# Patient Record
Sex: MALE | Race: OTHER | NOT HISPANIC OR LATINO
[De-identification: names, ages, dates, MRNs, and addresses within clinical notes are randomized per-mention and may not be internally consistent; named-entity substitution may affect disease eponyms.]

---

## 2017-01-10 ENCOUNTER — APPOINTMENT (OUTPATIENT)
Dept: ENDOCRINOLOGY | Facility: CLINIC | Age: 57
End: 2017-01-10

## 2017-01-26 ENCOUNTER — LABORATORY RESULT (OUTPATIENT)
Age: 57
End: 2017-01-26

## 2017-02-08 ENCOUNTER — APPOINTMENT (OUTPATIENT)
Dept: ENDOCRINOLOGY | Facility: CLINIC | Age: 57
End: 2017-02-08

## 2017-02-08 VITALS
WEIGHT: 165 LBS | HEIGHT: 66 IN | BODY MASS INDEX: 26.52 KG/M2 | SYSTOLIC BLOOD PRESSURE: 142 MMHG | DIASTOLIC BLOOD PRESSURE: 92 MMHG | HEART RATE: 74 BPM

## 2017-02-08 LAB — GLUCOSE BLDC GLUCOMTR-MCNC: 107

## 2017-03-24 ENCOUNTER — LABORATORY RESULT (OUTPATIENT)
Age: 57
End: 2017-03-24

## 2017-03-27 ENCOUNTER — APPOINTMENT (OUTPATIENT)
Dept: ENDOCRINOLOGY | Facility: CLINIC | Age: 57
End: 2017-03-27

## 2017-03-27 VITALS
WEIGHT: 162 LBS | DIASTOLIC BLOOD PRESSURE: 76 MMHG | SYSTOLIC BLOOD PRESSURE: 122 MMHG | HEIGHT: 66 IN | BODY MASS INDEX: 26.03 KG/M2 | HEART RATE: 76 BPM

## 2017-03-27 LAB
ALBUMIN SERPL ELPH-MCNC: 4.1 G/DL
ALP BLD-CCNC: 76 U/L
ALT SERPL-CCNC: 15 U/L
AMYLASE/CREAT SERPL: 141 U/L
ANION GAP SERPL CALC-SCNC: 15 MMOL/L
AST SERPL-CCNC: 10 U/L
BILIRUB SERPL-MCNC: 0.3 MG/DL
BUN SERPL-MCNC: 18 MG/DL
CALCIUM SERPL-MCNC: 9.9 MG/DL
CHLORIDE SERPL-SCNC: 103 MMOL/L
CO2 SERPL-SCNC: 21 MMOL/L
CREAT SERPL-MCNC: 1.16 MG/DL
GGT SERPL-CCNC: 42 U/L
GLUCOSE BLDC GLUCOMTR-MCNC: 154
GLUCOSE SERPL-MCNC: 215 MG/DL
LPL SERPL-CCNC: 91 U/L
POTASSIUM SERPL-SCNC: 4.2 MMOL/L
PROT SERPL-MCNC: 7.6 G/DL
SODIUM SERPL-SCNC: 139 MMOL/L

## 2017-04-14 ENCOUNTER — LABORATORY RESULT (OUTPATIENT)
Age: 57
End: 2017-04-14

## 2017-05-07 ENCOUNTER — MOBILE ON CALL (OUTPATIENT)
Age: 57
End: 2017-05-07

## 2017-06-08 ENCOUNTER — APPOINTMENT (OUTPATIENT)
Dept: ENDOCRINOLOGY | Facility: CLINIC | Age: 57
End: 2017-06-08

## 2017-06-08 ENCOUNTER — LABORATORY RESULT (OUTPATIENT)
Age: 57
End: 2017-06-08

## 2017-06-08 VITALS
WEIGHT: 165 LBS | DIASTOLIC BLOOD PRESSURE: 80 MMHG | HEART RATE: 80 BPM | BODY MASS INDEX: 26.52 KG/M2 | SYSTOLIC BLOOD PRESSURE: 120 MMHG | HEIGHT: 66 IN

## 2017-06-08 LAB
GLUCOSE BLDC GLUCOMTR-MCNC: 319
HBA1C MFR BLD HPLC: 10.3

## 2017-06-12 LAB
ALBUMIN SERPL ELPH-MCNC: 4.7 G/DL
ALP BLD-CCNC: 80 U/L
ALT SERPL-CCNC: 25 U/L
AMYLASE/CREAT SERPL: 144 U/L
ANION GAP SERPL CALC-SCNC: 20 MMOL/L
AST SERPL-CCNC: 20 U/L
BILIRUB SERPL-MCNC: 0.2 MG/DL
BUN SERPL-MCNC: 23 MG/DL
CALCIUM SERPL-MCNC: 9.7 MG/DL
CHLORIDE SERPL-SCNC: 98 MMOL/L
CO2 SERPL-SCNC: 18 MMOL/L
CREAT SERPL-MCNC: 1.29 MG/DL
GLUCOSE SERPL-MCNC: 336 MG/DL
LPL SERPL-CCNC: 104 U/L
POTASSIUM SERPL-SCNC: 5.1 MMOL/L
PROT SERPL-MCNC: 7.6 G/DL
SODIUM SERPL-SCNC: 136 MMOL/L

## 2017-06-21 ENCOUNTER — APPOINTMENT (OUTPATIENT)
Dept: ENDOCRINOLOGY | Facility: CLINIC | Age: 57
End: 2017-06-21

## 2017-07-05 ENCOUNTER — RX RENEWAL (OUTPATIENT)
Age: 57
End: 2017-07-05

## 2017-07-24 ENCOUNTER — APPOINTMENT (OUTPATIENT)
Dept: ENDOCRINOLOGY | Facility: CLINIC | Age: 57
End: 2017-07-24

## 2017-09-01 ENCOUNTER — MOBILE ON CALL (OUTPATIENT)
Age: 57
End: 2017-09-01

## 2017-09-07 ENCOUNTER — LABORATORY RESULT (OUTPATIENT)
Age: 57
End: 2017-09-07

## 2017-09-07 ENCOUNTER — APPOINTMENT (OUTPATIENT)
Dept: ENDOCRINOLOGY | Facility: CLINIC | Age: 57
End: 2017-09-07
Payer: COMMERCIAL

## 2017-09-07 VITALS
SYSTOLIC BLOOD PRESSURE: 142 MMHG | HEART RATE: 72 BPM | WEIGHT: 167 LBS | HEIGHT: 66 IN | DIASTOLIC BLOOD PRESSURE: 84 MMHG | BODY MASS INDEX: 26.84 KG/M2

## 2017-09-07 LAB
GLUCOSE BLDC GLUCOMTR-MCNC: 143
HBA1C MFR BLD HPLC: 7.3

## 2017-09-07 PROCEDURE — 82962 GLUCOSE BLOOD TEST: CPT

## 2017-09-07 PROCEDURE — 99214 OFFICE O/P EST MOD 30 MIN: CPT | Mod: 25

## 2017-09-07 PROCEDURE — 95251 CONT GLUC MNTR ANALYSIS I&R: CPT

## 2017-09-07 PROCEDURE — 83036 HEMOGLOBIN GLYCOSYLATED A1C: CPT | Mod: QW

## 2017-09-07 RX ORDER — PEN NEEDLE, DIABETIC 29 G X1/2"
31G X 8 MM NEEDLE, DISPOSABLE MISCELLANEOUS
Qty: 100 | Refills: 0 | Status: ACTIVE | COMMUNITY
Start: 2017-03-15

## 2017-09-25 ENCOUNTER — RX RENEWAL (OUTPATIENT)
Age: 57
End: 2017-09-25

## 2017-11-07 ENCOUNTER — APPOINTMENT (OUTPATIENT)
Dept: ENDOCRINOLOGY | Facility: CLINIC | Age: 57
End: 2017-11-07

## 2017-11-15 ENCOUNTER — RX RENEWAL (OUTPATIENT)
Age: 57
End: 2017-11-15

## 2017-11-29 ENCOUNTER — LABORATORY RESULT (OUTPATIENT)
Age: 57
End: 2017-11-29

## 2017-11-29 LAB
AMYLASE/CREAT SERPL: 311 U/L
APPEARANCE: CLEAR
BILIRUBIN URINE: NEGATIVE
BLOOD URINE: ABNORMAL
CHOLEST SERPL-MCNC: 141 MG/DL
CHOLEST/HDLC SERPL: 3 RATIO
COLOR: YELLOW
CREAT SPEC-SCNC: 82 MG/DL
GLUCOSE QUALITATIVE U: 1000 MG/DL
HBA1C MFR BLD HPLC: 7.5 %
HDLC SERPL-MCNC: 47 MG/DL
KETONES URINE: NEGATIVE
LDLC SERPL CALC-MCNC: 60 MG/DL
LEUKOCYTE ESTERASE URINE: NEGATIVE
LPL SERPL-CCNC: 486 U/L
MICROALBUMIN 24H UR DL<=1MG/L-MCNC: 1.7 MG/DL
MICROALBUMIN/CREAT 24H UR-RTO: 21 MG/G
NITRITE URINE: NEGATIVE
PH URINE: 5.5
PROTEIN URINE: NEGATIVE MG/DL
SPECIFIC GRAVITY URINE: 1.03
T4 FREE SERPL-MCNC: 1.1 NG/DL
T4 SERPL-MCNC: 5.5 UG/DL
TRIGL SERPL-MCNC: 168 MG/DL
TSH SERPL-ACNC: 1.78 UIU/ML
UROBILINOGEN URINE: NEGATIVE MG/DL

## 2017-12-04 LAB
ALBUMIN SERPL ELPH-MCNC: 4.6 G/DL
ALP BLD-CCNC: 60 U/L
ALT SERPL-CCNC: 18 U/L
AST SERPL-CCNC: 12 U/L
BILIRUB SERPL-MCNC: 0.4 MG/DL
CALCIUM SERPL-MCNC: 10.4 MG/DL
CHLORIDE SERPL-SCNC: 105 MMOL/L
CO2 SERPL-SCNC: 24 MMOL/L
CREAT SERPL-MCNC: 1.27 MG/DL
GLUCOSE SERPL-MCNC: 159 MG/DL
PROT SERPL-MCNC: 8 G/DL
SODIUM SERPL-SCNC: 145 MMOL/L

## 2017-12-06 ENCOUNTER — RX RENEWAL (OUTPATIENT)
Age: 57
End: 2017-12-06

## 2018-02-05 ENCOUNTER — APPOINTMENT (OUTPATIENT)
Dept: ENDOCRINOLOGY | Facility: CLINIC | Age: 58
End: 2018-02-05
Payer: COMMERCIAL

## 2018-02-05 ENCOUNTER — RX RENEWAL (OUTPATIENT)
Age: 58
End: 2018-02-05

## 2018-02-05 VITALS
BODY MASS INDEX: 26.38 KG/M2 | SYSTOLIC BLOOD PRESSURE: 152 MMHG | HEART RATE: 84 BPM | HEIGHT: 66 IN | WEIGHT: 164.13 LBS | DIASTOLIC BLOOD PRESSURE: 100 MMHG

## 2018-02-05 LAB
GLUCOSE BLDC GLUCOMTR-MCNC: 131
HBA1C MFR BLD HPLC: 7.7

## 2018-02-05 PROCEDURE — 82962 GLUCOSE BLOOD TEST: CPT

## 2018-02-05 PROCEDURE — 99214 OFFICE O/P EST MOD 30 MIN: CPT | Mod: 25

## 2018-02-05 PROCEDURE — 95251 CONT GLUC MNTR ANALYSIS I&R: CPT

## 2018-02-05 PROCEDURE — 83036 HEMOGLOBIN GLYCOSYLATED A1C: CPT | Mod: QW

## 2018-02-07 ENCOUNTER — RX RENEWAL (OUTPATIENT)
Age: 58
End: 2018-02-07

## 2018-02-12 ENCOUNTER — LABORATORY RESULT (OUTPATIENT)
Age: 58
End: 2018-02-12

## 2018-02-12 ENCOUNTER — APPOINTMENT (OUTPATIENT)
Dept: ENDOCRINOLOGY | Facility: CLINIC | Age: 58
End: 2018-02-12
Payer: COMMERCIAL

## 2018-02-12 PROCEDURE — G0108 DIAB MANAGE TRN  PER INDIV: CPT

## 2018-03-14 ENCOUNTER — APPOINTMENT (OUTPATIENT)
Dept: ENDOCRINOLOGY | Facility: CLINIC | Age: 58
End: 2018-03-14
Payer: COMMERCIAL

## 2018-03-14 VITALS — HEART RATE: 72 BPM | DIASTOLIC BLOOD PRESSURE: 90 MMHG | SYSTOLIC BLOOD PRESSURE: 180 MMHG

## 2018-03-14 VITALS
WEIGHT: 165 LBS | DIASTOLIC BLOOD PRESSURE: 95 MMHG | SYSTOLIC BLOOD PRESSURE: 190 MMHG | HEART RATE: 66 BPM | BODY MASS INDEX: 26.63 KG/M2

## 2018-03-14 VITALS — HEART RATE: 72 BPM | SYSTOLIC BLOOD PRESSURE: 190 MMHG | DIASTOLIC BLOOD PRESSURE: 100 MMHG

## 2018-03-14 LAB — GLUCOSE BLDC GLUCOMTR-MCNC: 172

## 2018-03-14 PROCEDURE — 82962 GLUCOSE BLOOD TEST: CPT

## 2018-03-14 PROCEDURE — G0108 DIAB MANAGE TRN  PER INDIV: CPT

## 2018-03-14 RX ORDER — PEN NEEDLE, DIABETIC 29 G X1/2"
32G X 4 MM NEEDLE, DISPOSABLE MISCELLANEOUS
Qty: 1 | Refills: 5 | Status: ACTIVE | COMMUNITY
Start: 2018-03-14

## 2018-03-21 ENCOUNTER — APPOINTMENT (OUTPATIENT)
Dept: CHRONIC DISEASE MANAGEMENT | Facility: CLINIC | Age: 58
End: 2018-03-21

## 2018-04-02 ENCOUNTER — RX RENEWAL (OUTPATIENT)
Age: 58
End: 2018-04-02

## 2018-04-09 ENCOUNTER — RX RENEWAL (OUTPATIENT)
Age: 58
End: 2018-04-09

## 2018-05-03 ENCOUNTER — APPOINTMENT (OUTPATIENT)
Dept: ENDOCRINOLOGY | Facility: CLINIC | Age: 58
End: 2018-05-03
Payer: COMMERCIAL

## 2018-05-03 VITALS
DIASTOLIC BLOOD PRESSURE: 94 MMHG | HEIGHT: 66 IN | WEIGHT: 161 LBS | SYSTOLIC BLOOD PRESSURE: 169 MMHG | BODY MASS INDEX: 25.88 KG/M2 | HEART RATE: 75 BPM | OXYGEN SATURATION: 97 %

## 2018-05-03 LAB — GLUCOSE BLDC GLUCOMTR-MCNC: 122

## 2018-05-03 PROCEDURE — 99214 OFFICE O/P EST MOD 30 MIN: CPT | Mod: 25

## 2018-05-03 PROCEDURE — 82962 GLUCOSE BLOOD TEST: CPT

## 2018-05-03 PROCEDURE — 95251 CONT GLUC MNTR ANALYSIS I&R: CPT

## 2018-06-18 ENCOUNTER — RX RENEWAL (OUTPATIENT)
Age: 58
End: 2018-06-18

## 2018-10-02 ENCOUNTER — RX RENEWAL (OUTPATIENT)
Age: 58
End: 2018-10-02

## 2018-10-04 ENCOUNTER — APPOINTMENT (OUTPATIENT)
Dept: ENDOCRINOLOGY | Facility: CLINIC | Age: 58
End: 2018-10-04
Payer: COMMERCIAL

## 2018-10-04 VITALS
SYSTOLIC BLOOD PRESSURE: 175 MMHG | HEIGHT: 65.35 IN | WEIGHT: 158 LBS | DIASTOLIC BLOOD PRESSURE: 90 MMHG | HEART RATE: 82 BPM | OXYGEN SATURATION: 99 % | BODY MASS INDEX: 26.01 KG/M2

## 2018-10-04 LAB
GLUCOSE BLDC GLUCOMTR-MCNC: 137
HBA1C MFR BLD HPLC: 8.3

## 2018-10-04 PROCEDURE — 99214 OFFICE O/P EST MOD 30 MIN: CPT | Mod: 25

## 2018-10-04 PROCEDURE — 82962 GLUCOSE BLOOD TEST: CPT

## 2018-10-04 PROCEDURE — 83036 HEMOGLOBIN GLYCOSYLATED A1C: CPT | Mod: QW

## 2018-10-08 ENCOUNTER — RX RENEWAL (OUTPATIENT)
Age: 58
End: 2018-10-08

## 2018-10-29 ENCOUNTER — RX RENEWAL (OUTPATIENT)
Age: 58
End: 2018-10-29

## 2018-11-08 ENCOUNTER — APPOINTMENT (OUTPATIENT)
Dept: ENDOCRINOLOGY | Facility: CLINIC | Age: 58
End: 2018-11-08
Payer: COMMERCIAL

## 2018-11-08 VITALS
BODY MASS INDEX: 26.34 KG/M2 | SYSTOLIC BLOOD PRESSURE: 136 MMHG | OXYGEN SATURATION: 97 % | DIASTOLIC BLOOD PRESSURE: 80 MMHG | HEART RATE: 79 BPM | WEIGHT: 160 LBS | HEIGHT: 65.35 IN

## 2018-11-08 LAB — GLUCOSE BLDC GLUCOMTR-MCNC: 135

## 2018-11-08 PROCEDURE — 95251 CONT GLUC MNTR ANALYSIS I&R: CPT

## 2018-11-08 PROCEDURE — 99214 OFFICE O/P EST MOD 30 MIN: CPT | Mod: 25

## 2018-12-11 ENCOUNTER — LABORATORY RESULT (OUTPATIENT)
Age: 58
End: 2018-12-11

## 2018-12-17 ENCOUNTER — APPOINTMENT (OUTPATIENT)
Dept: ENDOCRINOLOGY | Facility: CLINIC | Age: 58
End: 2018-12-17
Payer: COMMERCIAL

## 2018-12-17 VITALS
WEIGHT: 159.38 LBS | OXYGEN SATURATION: 98 % | DIASTOLIC BLOOD PRESSURE: 88 MMHG | SYSTOLIC BLOOD PRESSURE: 150 MMHG | HEART RATE: 97 BPM | BODY MASS INDEX: 26.24 KG/M2

## 2018-12-17 LAB — GLUCOSE BLDC GLUCOMTR-MCNC: 122

## 2018-12-17 PROCEDURE — 99214 OFFICE O/P EST MOD 30 MIN: CPT | Mod: 25

## 2018-12-17 PROCEDURE — 95251 CONT GLUC MNTR ANALYSIS I&R: CPT

## 2018-12-20 ENCOUNTER — MEDICATION RENEWAL (OUTPATIENT)
Age: 58
End: 2018-12-20

## 2019-01-07 ENCOUNTER — NON-APPOINTMENT (OUTPATIENT)
Age: 59
End: 2019-01-07

## 2019-01-07 ENCOUNTER — APPOINTMENT (OUTPATIENT)
Dept: FAMILY MEDICINE | Facility: CLINIC | Age: 59
End: 2019-01-07
Payer: COMMERCIAL

## 2019-01-07 VITALS
WEIGHT: 160 LBS | HEART RATE: 79 BPM | OXYGEN SATURATION: 96 % | DIASTOLIC BLOOD PRESSURE: 90 MMHG | HEIGHT: 63.5 IN | SYSTOLIC BLOOD PRESSURE: 130 MMHG | BODY MASS INDEX: 28 KG/M2 | RESPIRATION RATE: 16 BRPM

## 2019-01-07 PROCEDURE — 99396 PREV VISIT EST AGE 40-64: CPT | Mod: 25

## 2019-01-07 PROCEDURE — 93000 ELECTROCARDIOGRAM COMPLETE: CPT

## 2019-01-23 ENCOUNTER — APPOINTMENT (OUTPATIENT)
Dept: ENDOCRINOLOGY | Facility: CLINIC | Age: 59
End: 2019-01-23
Payer: COMMERCIAL

## 2019-01-23 VITALS
HEIGHT: 63.5 IN | WEIGHT: 158 LBS | DIASTOLIC BLOOD PRESSURE: 82 MMHG | SYSTOLIC BLOOD PRESSURE: 127 MMHG | BODY MASS INDEX: 27.65 KG/M2 | HEART RATE: 77 BPM | OXYGEN SATURATION: 98 %

## 2019-01-23 LAB
GLUCOSE BLDC GLUCOMTR-MCNC: 206
HBA1C MFR BLD HPLC: 6.8

## 2019-01-23 PROCEDURE — 99214 OFFICE O/P EST MOD 30 MIN: CPT | Mod: 25

## 2019-01-23 PROCEDURE — 95251 CONT GLUC MNTR ANALYSIS I&R: CPT

## 2019-01-23 PROCEDURE — 83036 HEMOGLOBIN GLYCOSYLATED A1C: CPT | Mod: QW

## 2019-01-23 PROCEDURE — 82962 GLUCOSE BLOOD TEST: CPT

## 2019-01-23 NOTE — HISTORY OF PRESENT ILLNESS
[FreeTextEntry1] : Type 2 diabetes and has been using DexCom sensor. He has not had any significant low sugars however his sugars are averaging around 180 and his a1C has increased. he feels his diet is good and he is compliant with his medication, Toujeo, Apidra , Jardiance and metformin. He occasionally skips Apidra before meals.  He has not had recent labs. he did see his eye doctor and was stable.

## 2019-01-23 NOTE — ASSESSMENT
[FreeTextEntry1] : Patient with moderately controlled type 2 diabetes. He promises to be more careful with his diet. He will remain on her current regimen of medication.He'll remain off alcohol. \par We did review his sensor download which shows  TIR only 60.85%, worn 73.3 % of the time.  AVG glucose 170 and over 250 9.6%. He is generally highest in evening. he will make sure to take Apidra before all meals, He will meet with CDE re insulin pump therapy.  [Carbohydrate Consistent Diet] : carbohydrate consistent diet [Diabetes Foot Care] : diabetes foot care [Long Term Vascular Complications] : long term vascular complications of diabetes [Importance of Diet and Exercise] : importance of diet and exercise to improve glycemic control, achieve weight loss and improve cardiovascular health [Action and use of Insulin] : action and use of short and long-acting insulin [Self Monitoring of Blood Glucose] : self monitoring of blood glucose [Retinopathy Screening] : Patient was referred to ophthalmology for retinopathy screening

## 2019-01-23 NOTE — PHYSICAL EXAM
[Alert] : alert [No Acute Distress] : no acute distress [Well Nourished] : well nourished [Well Developed] : well developed [Normal Sclera/Conjunctiva] : normal sclera/conjunctiva [EOMI] : extra ocular movement intact [No Proptosis] : no proptosis [Normal Oropharynx] : the oropharynx was normal [Thyroid Not Enlarged] : the thyroid was not enlarged [No Thyroid Nodules] : there were no palpable thyroid nodules [No Respiratory Distress] : no respiratory distress [No Accessory Muscle Use] : no accessory muscle use [Clear to Auscultation] : lungs were clear to auscultation bilaterally [Normal Rate] : heart rate was normal  [Normal S1, S2] : normal S1 and S2 [Regular Rhythm] : with a regular rhythm [No Edema] : there was no peripheral edema [Normal Bowel Sounds] : normal bowel sounds [Not Tender] : non-tender [Soft] : abdomen soft [Not Distended] : not distended [Normal Gait] : normal gait [Normal Strength/Tone] : muscle strength and tone were normal [No Rash] : no rash [Acanthosis Nigricans] : no acanthosis nigricans [Normal Reflexes] : deep tendon reflexes were 2+ and symmetric [No Tremors] : no tremors [Oriented x3] : oriented to person, place, and time

## 2019-02-04 ENCOUNTER — MEDICATION RENEWAL (OUTPATIENT)
Age: 59
End: 2019-02-04

## 2019-02-13 ENCOUNTER — APPOINTMENT (OUTPATIENT)
Dept: ENDOCRINOLOGY | Facility: CLINIC | Age: 59
End: 2019-02-13

## 2019-02-21 ENCOUNTER — MEDICATION RENEWAL (OUTPATIENT)
Age: 59
End: 2019-02-21

## 2019-03-20 ENCOUNTER — APPOINTMENT (OUTPATIENT)
Dept: ENDOCRINOLOGY | Facility: CLINIC | Age: 59
End: 2019-03-20

## 2019-04-01 ENCOUNTER — LABORATORY RESULT (OUTPATIENT)
Age: 59
End: 2019-04-01

## 2019-04-01 ENCOUNTER — APPOINTMENT (OUTPATIENT)
Dept: FAMILY MEDICINE | Facility: CLINIC | Age: 59
End: 2019-04-01
Payer: COMMERCIAL

## 2019-04-01 VITALS
HEART RATE: 77 BPM | SYSTOLIC BLOOD PRESSURE: 125 MMHG | BODY MASS INDEX: 28.35 KG/M2 | WEIGHT: 162 LBS | OXYGEN SATURATION: 98 % | DIASTOLIC BLOOD PRESSURE: 80 MMHG | RESPIRATION RATE: 16 BRPM | HEIGHT: 63.5 IN

## 2019-04-01 DIAGNOSIS — R49.0 DYSPHONIA: ICD-10-CM

## 2019-04-01 PROCEDURE — 99213 OFFICE O/P EST LOW 20 MIN: CPT

## 2019-04-04 ENCOUNTER — TRANSCRIPTION ENCOUNTER (OUTPATIENT)
Age: 59
End: 2019-04-04

## 2019-04-08 ENCOUNTER — APPOINTMENT (OUTPATIENT)
Dept: ENDOCRINOLOGY | Facility: CLINIC | Age: 59
End: 2019-04-08
Payer: COMMERCIAL

## 2019-04-08 VITALS
HEIGHT: 63.5 IN | BODY MASS INDEX: 27.12 KG/M2 | SYSTOLIC BLOOD PRESSURE: 136 MMHG | HEART RATE: 71 BPM | WEIGHT: 155 LBS | DIASTOLIC BLOOD PRESSURE: 83 MMHG

## 2019-04-08 LAB — GLUCOSE BLDC GLUCOMTR-MCNC: 139

## 2019-04-08 PROCEDURE — 95251 CONT GLUC MNTR ANALYSIS I&R: CPT

## 2019-04-08 PROCEDURE — G0108 DIAB MANAGE TRN  PER INDIV: CPT

## 2019-04-15 ENCOUNTER — APPOINTMENT (OUTPATIENT)
Dept: CARDIOLOGY | Facility: CLINIC | Age: 59
End: 2019-04-15
Payer: COMMERCIAL

## 2019-04-15 VITALS
TEMPERATURE: 98.1 F | SYSTOLIC BLOOD PRESSURE: 177 MMHG | RESPIRATION RATE: 16 BRPM | WEIGHT: 154 LBS | BODY MASS INDEX: 24.75 KG/M2 | DIASTOLIC BLOOD PRESSURE: 99 MMHG | HEART RATE: 83 BPM | HEIGHT: 66 IN | OXYGEN SATURATION: 98 %

## 2019-04-15 VITALS — SYSTOLIC BLOOD PRESSURE: 200 MMHG | DIASTOLIC BLOOD PRESSURE: 110 MMHG

## 2019-04-15 PROCEDURE — 99204 OFFICE O/P NEW MOD 45 MIN: CPT

## 2019-04-15 NOTE — DISCUSSION/SUMMARY
[FreeTextEntry1] : 59M DM, HTN, HLD for cardaic evaluation.  BP markedly elevated today, did not take meds yet today. Usually better controlled. Denies headaches\par \par 1. HTN:  Continue current regimen as it has worked for him in the past.  Bystolic 5mg, Norvasc 10, Losartan 25mg.  \par \par 2. HLD: Cont lipitor, lipids at goal. TG slightly high.  Continue to encourage diet and exercise, healthy eating. \par \par 3. DM: A1C 7.3%, management as per endo.  \par \par Given multiple risk factors will check echo, stress and carotid. \par \par RV 3 months

## 2019-04-15 NOTE — HISTORY OF PRESENT ILLNESS
[FreeTextEntry1] : 59M with DM (7.3%), HTN, HLD who presents for cardiac evaluation. Feels well without complaints chest pain, shortness of breath, or palpitations.  Denies dizziness, lightheadedness. 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 \par \par ECG: SR, no ST-T wave changes\par Lipids: 159/56/64/197\par \par Cardiac meds: Amlodipine 10mg, Lipitor 40mg, Bystolic 5mg daily, Losartan 100mg, asa 81mg, Jardiance, metformin, insulin

## 2019-04-15 NOTE — REVIEW OF SYSTEMS
[Negative] : Respiratory [Fever] : no fever [Chills] : no chills [Shortness Of Breath] : no shortness of breath [Chest Pain] : no chest pain [Cough] : no cough [Palpitations] : no palpitations

## 2019-04-29 ENCOUNTER — APPOINTMENT (OUTPATIENT)
Dept: INTERNAL MEDICINE | Facility: CLINIC | Age: 59
End: 2019-04-29

## 2019-04-29 ENCOUNTER — APPOINTMENT (OUTPATIENT)
Dept: CARDIOLOGY | Facility: CLINIC | Age: 59
End: 2019-04-29
Payer: COMMERCIAL

## 2019-04-29 PROCEDURE — 93015 CV STRESS TEST SUPVJ I&R: CPT

## 2019-04-30 ENCOUNTER — APPOINTMENT (OUTPATIENT)
Dept: ENDOCRINOLOGY | Facility: CLINIC | Age: 59
End: 2019-04-30
Payer: COMMERCIAL

## 2019-04-30 VITALS
DIASTOLIC BLOOD PRESSURE: 81 MMHG | WEIGHT: 154 LBS | HEART RATE: 98 BPM | SYSTOLIC BLOOD PRESSURE: 139 MMHG | OXYGEN SATURATION: 97 % | HEIGHT: 66 IN | BODY MASS INDEX: 24.75 KG/M2

## 2019-04-30 LAB — GLUCOSE BLDC GLUCOMTR-MCNC: 118

## 2019-04-30 PROCEDURE — 99214 OFFICE O/P EST MOD 30 MIN: CPT | Mod: 25

## 2019-04-30 PROCEDURE — 82962 GLUCOSE BLOOD TEST: CPT

## 2019-04-30 PROCEDURE — 95251 CONT GLUC MNTR ANALYSIS I&R: CPT

## 2019-04-30 NOTE — HISTORY OF PRESENT ILLNESS
[FreeTextEntry1] : Type 2 diabetes and has been using DexCom sensor. He has not had any significant low sugars; sugars are averaging around 150 and his a1C has increased to 7.3. . He feels his diet is good and he is compliant with his medication, Toujeo, Apidra , Jardiance and metformin. He occasionally skips Apidra before meals.  he did see his eye doctor and was stable. He recently saw a cardiologist.

## 2019-04-30 NOTE — ASSESSMENT
[FreeTextEntry1] : Patient with moderately controlled type 2 diabetes. He promises to be more careful with his diet. He will remain on her current regimen of medication.He'll remain off alcohol. \par We did review his sensor download which shows  TIR  76.4 % ( up from  60.85%,)  worn 77.9 % of the time.  AVG glucose 157 and over 250 5 5 ( down from 9.6%). . He is generally highest in evening. he will make sure to take Apidra before all meals, Overall this is better.  [Carbohydrate Consistent Diet] : carbohydrate consistent diet [Hypoglycemia Management] : hypoglycemia management [Diabetes Foot Care] : diabetes foot care [Long Term Vascular Complications] : long term vascular complications of diabetes [Importance of Diet and Exercise] : importance of diet and exercise to improve glycemic control, achieve weight loss and improve cardiovascular health [Action and use of Insulin] : action and use of short and long-acting insulin [Self Monitoring of Blood Glucose] : self monitoring of blood glucose [Retinopathy Screening] : Patient was referred to ophthalmology for retinopathy screening

## 2019-05-16 ENCOUNTER — APPOINTMENT (OUTPATIENT)
Dept: ENDOCRINOLOGY | Facility: CLINIC | Age: 59
End: 2019-05-16

## 2019-05-24 ENCOUNTER — APPOINTMENT (OUTPATIENT)
Dept: UROLOGY | Facility: CLINIC | Age: 59
End: 2019-05-24

## 2019-05-28 ENCOUNTER — RX RENEWAL (OUTPATIENT)
Age: 59
End: 2019-05-28

## 2019-06-18 ENCOUNTER — APPOINTMENT (OUTPATIENT)
Dept: INTERNAL MEDICINE | Facility: CLINIC | Age: 59
End: 2019-06-18
Payer: COMMERCIAL

## 2019-06-18 PROCEDURE — ZZZZZ: CPT

## 2019-06-18 PROCEDURE — 93922 UPR/L XTREMITY ART 2 LEVELS: CPT

## 2019-06-18 PROCEDURE — 93306 TTE W/DOPPLER COMPLETE: CPT

## 2019-06-18 PROCEDURE — 93880 EXTRACRANIAL BILAT STUDY: CPT

## 2019-07-07 ENCOUNTER — APPOINTMENT (OUTPATIENT)
Dept: FAMILY MEDICINE | Facility: CLINIC | Age: 59
End: 2019-07-07
Payer: COMMERCIAL

## 2019-07-07 VITALS
SYSTOLIC BLOOD PRESSURE: 130 MMHG | DIASTOLIC BLOOD PRESSURE: 82 MMHG | WEIGHT: 152 LBS | BODY MASS INDEX: 24.43 KG/M2 | HEIGHT: 66 IN

## 2019-07-07 PROCEDURE — 99214 OFFICE O/P EST MOD 30 MIN: CPT

## 2019-07-23 ENCOUNTER — MEDICATION RENEWAL (OUTPATIENT)
Age: 59
End: 2019-07-23

## 2019-07-29 ENCOUNTER — APPOINTMENT (OUTPATIENT)
Dept: ENDOCRINOLOGY | Facility: CLINIC | Age: 59
End: 2019-07-29
Payer: COMMERCIAL

## 2019-07-29 VITALS
HEIGHT: 66 IN | SYSTOLIC BLOOD PRESSURE: 162 MMHG | HEART RATE: 80 BPM | BODY MASS INDEX: 25.23 KG/M2 | OXYGEN SATURATION: 97 % | WEIGHT: 157 LBS | DIASTOLIC BLOOD PRESSURE: 78 MMHG

## 2019-07-29 LAB
GLUCOSE BLDC GLUCOMTR-MCNC: 108
HBA1C MFR BLD HPLC: 7.3

## 2019-07-29 PROCEDURE — 99214 OFFICE O/P EST MOD 30 MIN: CPT | Mod: 25

## 2019-07-29 PROCEDURE — 83036 HEMOGLOBIN GLYCOSYLATED A1C: CPT | Mod: QW

## 2019-07-29 PROCEDURE — 82962 GLUCOSE BLOOD TEST: CPT

## 2019-07-29 PROCEDURE — 95251 CONT GLUC MNTR ANALYSIS I&R: CPT

## 2019-07-29 NOTE — ASSESSMENT
[Carbohydrate Consistent Diet] : carbohydrate consistent diet [FreeTextEntry1] : Patient with moderately controlled type 2 diabetes. He promises to be more careful with his diet. He will remain on her current regimen of medication.He'll remain off alcohol. \par We did review his sensor download which shows  TIR 65.7 (76.4 %) worn 77.6( 77.9 )% of the time.  AVG glucose 165 and over 250  6.4  He is generally highest in evening.  Low number can be in early morning hours. He will make sure to take Apidra before all meals, Overall this is better. He was given a new scale for Apidra and f/u with CDE in one month. Lower Toujeo from 35 to 30. To do fasting labs. BP somewhat high today has been seeiing his PCP and cardiologist.  [Hypoglycemia Management] : hypoglycemia management [Diabetes Foot Care] : diabetes foot care [Long Term Vascular Complications] : long term vascular complications of diabetes [Action and use of Insulin] : action and use of short and long-acting insulin [Self Monitoring of Blood Glucose] : self monitoring of blood glucose [Importance of Diet and Exercise] : importance of diet and exercise to improve glycemic control, achieve weight loss and improve cardiovascular health [Retinopathy Screening] : Patient was referred to ophthalmology for retinopathy screening

## 2019-07-29 NOTE — HISTORY OF PRESENT ILLNESS
[FreeTextEntry1] : Type 2 diabetes and has been using DexCom sensor. He has not had any significant low sugars; sugars are averaging around 160 and his a1C has remained 7.3. . He feels his diet is good and he is compliant with his medication, Toujeo, Apidra , Jardiance and metformin. He occasionally skips Apidra before meals.  he did see his eye doctor and was stable. He recently saw a cardiologist.

## 2019-07-29 NOTE — PHYSICAL EXAM
[Alert] : alert [Well Nourished] : well nourished [Well Developed] : well developed [No Acute Distress] : no acute distress [Normal Sclera/Conjunctiva] : normal sclera/conjunctiva [EOMI] : extra ocular movement intact [Thyroid Not Enlarged] : the thyroid was not enlarged [No Proptosis] : no proptosis [Normal Oropharynx] : the oropharynx was normal [No Accessory Muscle Use] : no accessory muscle use [No Thyroid Nodules] : there were no palpable thyroid nodules [No Respiratory Distress] : no respiratory distress [Normal Rate] : heart rate was normal  [Clear to Auscultation] : lungs were clear to auscultation bilaterally [Regular Rhythm] : with a regular rhythm [No Edema] : there was no peripheral edema [Normal S1, S2] : normal S1 and S2 [Normal Bowel Sounds] : normal bowel sounds [Not Tender] : non-tender [Soft] : abdomen soft [Normal Gait] : normal gait [Not Distended] : not distended [No Rash] : no rash [Normal Strength/Tone] : muscle strength and tone were normal [Acanthosis Nigricans] : no acanthosis nigricans [No Tremors] : no tremors [Normal Reflexes] : deep tendon reflexes were 2+ and symmetric [Oriented x3] : oriented to person, place, and time

## 2019-08-25 ENCOUNTER — MOBILE ON CALL (OUTPATIENT)
Age: 59
End: 2019-08-25

## 2019-08-26 ENCOUNTER — RX RENEWAL (OUTPATIENT)
Age: 59
End: 2019-08-26

## 2019-08-28 ENCOUNTER — APPOINTMENT (OUTPATIENT)
Dept: ENDOCRINOLOGY | Facility: CLINIC | Age: 59
End: 2019-08-28

## 2019-09-09 ENCOUNTER — APPOINTMENT (OUTPATIENT)
Dept: INTERNAL MEDICINE | Facility: CLINIC | Age: 59
End: 2019-09-09
Payer: COMMERCIAL

## 2019-09-09 VITALS
HEART RATE: 66 BPM | WEIGHT: 156 LBS | BODY MASS INDEX: 25.18 KG/M2 | DIASTOLIC BLOOD PRESSURE: 90 MMHG | SYSTOLIC BLOOD PRESSURE: 164 MMHG

## 2019-09-09 DIAGNOSIS — Z12.12 ENCOUNTER FOR SCREENING FOR MALIGNANT NEOPLASM OF COLON: ICD-10-CM

## 2019-09-09 DIAGNOSIS — Z12.11 ENCOUNTER FOR SCREENING FOR MALIGNANT NEOPLASM OF COLON: ICD-10-CM

## 2019-09-09 PROCEDURE — 99204 OFFICE O/P NEW MOD 45 MIN: CPT

## 2019-09-09 NOTE — HISTORY OF PRESENT ILLNESS
[FreeTextEntry1] : referred for colonoscopy, Last one 10 years ago. . H/o DM on insulin and tujeao. Last A1c 7.3. 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

## 2019-09-09 NOTE — PHYSICAL EXAM
[General Appearance - Alert] : alert [General Appearance - In No Acute Distress] : in no acute distress [Sclera] : the sclera and conjunctiva were normal [PERRL With Normal Accommodation] : pupils were equal in size, round, and reactive to light [Extraocular Movements] : extraocular movements were intact [Outer Ear] : the ears and nose were normal in appearance [Oropharynx] : the oropharynx was normal [Neck Appearance] : the appearance of the neck was normal [Neck Cervical Mass (___cm)] : no neck mass was observed [Jugular Venous Distention Increased] : there was no jugular-venous distention [Thyroid Diffuse Enlargement] : the thyroid was not enlarged [Thyroid Nodule] : there were no palpable thyroid nodules [Heart Rate And Rhythm] : heart rate was normal and rhythm regular [Auscultation Breath Sounds / Voice Sounds] : lungs were clear to auscultation bilaterally [Heart Sounds] : normal S1 and S2 [Heart Sounds Gallop] : no gallops [Murmurs] : no murmurs [Heart Sounds Pericardial Friction Rub] : no pericardial rub [Full Pulse] : the pedal pulses are present [Edema] : there was no peripheral edema [Bowel Sounds] : normal bowel sounds [Abdomen Soft] : soft [Abdomen Tenderness] : non-tender [Abdomen Mass (___ Cm)] : no abdominal mass palpated [Penis Abnormality] : the penis was normal [Scrotum] : the scrotum was normal [Testes Swelling] : the testicles were not swollen [Testes Mass (___cm)] : there were no testicular masses [Cervical Lymph Nodes Enlarged Posterior Bilaterally] : posterior cervical [Cervical Lymph Nodes Enlarged Anterior Bilaterally] : anterior cervical [Supraclavicular Lymph Nodes Enlarged Bilaterally] : supraclavicular [Axillary Lymph Nodes Enlarged Bilaterally] : axillary [Femoral Lymph Nodes Enlarged Bilaterally] : femoral [Inguinal Lymph Nodes Enlarged Bilaterally] : inguinal [No CVA Tenderness] : no ~M costovertebral angle tenderness [No Spinal Tenderness] : no spinal tenderness [Abnormal Walk] : normal gait [Nail Clubbing] : no clubbing  or cyanosis of the fingernails [Musculoskeletal - Swelling] : no joint swelling seen [Motor Tone] : muscle strength and tone were normal [Skin Color & Pigmentation] : normal skin color and pigmentation [] : no rash [Skin Turgor] : normal skin turgor [Deep Tendon Reflexes (DTR)] : deep tendon reflexes were 2+ and symmetric [Sensation] : the sensory exam was normal to light touch and pinprick [No Focal Deficits] : no focal deficits [Oriented To Time, Place, And Person] : oriented to person, place, and time [Impaired Insight] : insight and judgment were intact [Affect] : the affect was normal

## 2019-10-03 ENCOUNTER — RX RENEWAL (OUTPATIENT)
Age: 59
End: 2019-10-03

## 2019-10-07 ENCOUNTER — RX RENEWAL (OUTPATIENT)
Age: 59
End: 2019-10-07

## 2019-10-28 ENCOUNTER — APPOINTMENT (OUTPATIENT)
Dept: CARDIOLOGY | Facility: CLINIC | Age: 59
End: 2019-10-28
Payer: COMMERCIAL

## 2019-10-28 VITALS
HEART RATE: 75 BPM | OXYGEN SATURATION: 97 % | BODY MASS INDEX: 25.07 KG/M2 | WEIGHT: 156 LBS | DIASTOLIC BLOOD PRESSURE: 78 MMHG | SYSTOLIC BLOOD PRESSURE: 145 MMHG | HEIGHT: 66 IN

## 2019-10-28 PROCEDURE — 99214 OFFICE O/P EST MOD 30 MIN: CPT

## 2019-10-28 NOTE — HISTORY OF PRESENT ILLNESS
[FreeTextEntry1] : 59M with DM (7.3%), HTN, HLD who presents for follow up of chornic conditions. BP was markeldly elevated at last visit, currently much better. Tolerating meds.  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 \par \par ECG: SR, no ST-T wave changes\par Lipids: 159/56/64/197\par EST: 9 mins, no ischemia, DTS 9\par Carotid 2019: Mild bilateral disease\par \par Cardiac meds: Amlodipine 10mg, Lipitor 40mg, Bystolic 5mg daily, Losartan 100mg, asa 81mg, Jardiance, metformin, insulin

## 2019-10-28 NOTE — PHYSICAL EXAM
[General Appearance - Well Developed] : well developed [Normal Appearance] : normal appearance [Well Groomed] : well groomed [General Appearance - Well Nourished] : well nourished [No Deformities] : no deformities [General Appearance - In No Acute Distress] : no acute distress [Normal Conjunctiva] : the conjunctiva exhibited no abnormalities [Eyelids - No Xanthelasma] : the eyelids demonstrated no xanthelasmas [Normal Oral Mucosa] : normal oral mucosa [No Oral Pallor] : no oral pallor [No Oral Cyanosis] : no oral cyanosis [Normal Jugular Venous A Waves Present] : normal jugular venous A waves present [Normal Jugular Venous V Waves Present] : normal jugular venous V waves present [No Jugular Venous Miranda A Waves] : no jugular venous miranda A waves [Respiration, Rhythm And Depth] : normal respiratory rhythm and effort [Exaggerated Use Of Accessory Muscles For Inspiration] : no accessory muscle use [Auscultation Breath Sounds / Voice Sounds] : lungs were clear to auscultation bilaterally [Heart Rate And Rhythm] : heart rate and rhythm were normal [Heart Sounds] : normal S1 and S2 [Murmurs] : no murmurs present [Edema] : no peripheral edema present [Abdomen Soft] : soft [Abdomen Tenderness] : non-tender [Abdomen Mass (___ Cm)] : no abdominal mass palpated [Abnormal Walk] : normal gait [Gait - Sufficient For Exercise Testing] : the gait was sufficient for exercise testing [Nail Clubbing] : no clubbing of the fingernails [Cyanosis, Localized] : no localized cyanosis [Petechial Hemorrhages (___cm)] : no petechial hemorrhages [Skin Color & Pigmentation] : normal skin color and pigmentation [] : no rash [No Venous Stasis] : no venous stasis [Skin Lesions] : no skin lesions [No Skin Ulcers] : no skin ulcer [No Xanthoma] : no  xanthoma was observed [Oriented To Time, Place, And Person] : oriented to person, place, and time [Affect] : the affect was normal [Mood] : the mood was normal [No Anxiety] : not feeling anxious

## 2019-10-28 NOTE — REVIEW OF SYSTEMS
[Fever] : no fever [Chills] : no chills [Shortness Of Breath] : no shortness of breath [Chest Pain] : no chest pain [Palpitations] : no palpitations [Cough] : no cough [Negative] : Respiratory

## 2019-10-28 NOTE — DISCUSSION/SUMMARY
[FreeTextEntry1] : 59M DM, HTN, HLD for cardiac evaluation.  BP much improved but still not perfect.  Cardiac testing within normal limits\par \par 1. HTN:  Continue current regimen as it has worked for him in the past.  Bystolic 5mg, Norvasc 10, Losartan 25mg.  Did not take meds yet today. \par \par 2. HLD: Cont lipitor, lipids at goal. TG slightly high.  Continue to encourage diet and exercise, healthy eating. Check lipids with PCP next month \par \par 3. DM: A1C 7.3%, management as per endo.  \par \par RV 3 months

## 2019-11-10 ENCOUNTER — APPOINTMENT (OUTPATIENT)
Dept: FAMILY MEDICINE | Facility: CLINIC | Age: 59
End: 2019-11-10
Payer: COMMERCIAL

## 2019-11-10 VITALS
OXYGEN SATURATION: 99 % | BODY MASS INDEX: 25.07 KG/M2 | HEIGHT: 66 IN | HEART RATE: 73 BPM | WEIGHT: 156 LBS | RESPIRATION RATE: 16 BRPM | DIASTOLIC BLOOD PRESSURE: 90 MMHG | SYSTOLIC BLOOD PRESSURE: 138 MMHG

## 2019-11-10 PROCEDURE — 99214 OFFICE O/P EST MOD 30 MIN: CPT

## 2019-11-11 ENCOUNTER — LABORATORY RESULT (OUTPATIENT)
Age: 59
End: 2019-11-11

## 2019-11-11 LAB
ALBUMIN SERPL ELPH-MCNC: 4.7 G/DL
ALP BLD-CCNC: 67 U/L
ALT SERPL-CCNC: 23 U/L
ANION GAP SERPL CALC-SCNC: 16 MMOL/L
APPEARANCE: CLEAR
AST SERPL-CCNC: 20 U/L
BILIRUB SERPL-MCNC: 0.7 MG/DL
BILIRUBIN URINE: NEGATIVE
BLOOD URINE: NORMAL
BUN SERPL-MCNC: 21 MG/DL
CALCIUM SERPL-MCNC: 9.6 MG/DL
CHLORIDE SERPL-SCNC: 100 MMOL/L
CHOLEST SERPL-MCNC: 169 MG/DL
CHOLEST/HDLC SERPL: 2.7 RATIO
CO2 SERPL-SCNC: 23 MMOL/L
COLOR: NORMAL
CREAT SERPL-MCNC: 1.01 MG/DL
CREAT SPEC-SCNC: 83 MG/DL
GLUCOSE QUALITATIVE U: ABNORMAL
GLUCOSE SERPL-MCNC: 119 MG/DL
HDLC SERPL-MCNC: 62 MG/DL
KETONES URINE: ABNORMAL
LDLC SERPL CALC-MCNC: 58 MG/DL
LEUKOCYTE ESTERASE URINE: NEGATIVE
MICROALBUMIN 24H UR DL<=1MG/L-MCNC: 6.2 MG/DL
MICROALBUMIN/CREAT 24H UR-RTO: 75 MG/G
NITRITE URINE: NEGATIVE
PH URINE: 6
POTASSIUM SERPL-SCNC: 4.3 MMOL/L
PROT SERPL-MCNC: 7.7 G/DL
PROTEIN URINE: NEGATIVE
SODIUM SERPL-SCNC: 139 MMOL/L
SPECIFIC GRAVITY URINE: 1.03
TRIGL SERPL-MCNC: 244 MG/DL
TSH SERPL-ACNC: 1.06 UIU/ML
UROBILINOGEN URINE: NORMAL

## 2019-11-12 ENCOUNTER — APPOINTMENT (OUTPATIENT)
Dept: ENDOCRINOLOGY | Facility: CLINIC | Age: 59
End: 2019-11-12
Payer: COMMERCIAL

## 2019-11-12 VITALS
OXYGEN SATURATION: 96 % | HEART RATE: 81 BPM | DIASTOLIC BLOOD PRESSURE: 84 MMHG | WEIGHT: 155.5 LBS | HEIGHT: 66 IN | BODY MASS INDEX: 24.99 KG/M2 | SYSTOLIC BLOOD PRESSURE: 161 MMHG

## 2019-11-12 LAB — GLUCOSE BLDC GLUCOMTR-MCNC: 215

## 2019-11-12 PROCEDURE — 99214 OFFICE O/P EST MOD 30 MIN: CPT | Mod: 25

## 2019-11-12 PROCEDURE — 95251 CONT GLUC MNTR ANALYSIS I&R: CPT

## 2019-11-12 PROCEDURE — 82962 GLUCOSE BLOOD TEST: CPT

## 2019-11-12 NOTE — HISTORY OF PRESENT ILLNESS
[FreeTextEntry1] : Type 2 diabetes and has been using DexCom sensor. He has not had any significant low sugars; sugars are averaging around 183 and his a1C has increased from  7.3 to 7.9.. . He feels his diet is good but he does admit to being non- compliant with his insulin regimen., Toujeo 30 and mealtime , Apidra , he does take  Jardiance and metformin. \par he is planning on having a colonoscopy the end of the month.

## 2019-11-12 NOTE — ASSESSMENT
[Diabetes Foot Care] : diabetes foot care [FreeTextEntry1] : Patient with moderately controlled type 2 diabetes. He promises to be more careful with his diet. He will remain on her current regimen of medication.He'll remain off alcohol. He will be more compliant with insulin, there are days he skips toujeo and often does not use Apidra at work. \par We did review his sensor download which shows  TIR 54.4( 65.7 )(76.4 %) worn  73.9% of the time.  AVG glucose 187 and over 250 10.3( 6.4  )He is generally highest in evening.  Low number can be in early morning hours. He will make sure to take Apidra before all meals, He also was instructed to take Toujeo 30 U  every day close to the same time. There has been a worsening of control on sensor which is most likely due to his missing his insulin doses\par f/u CDE one month and myself in 3 months. \par Apparently ran out of statin so cholesterol was high; renewed it. .  [Carbohydrate Consistent Diet] : carbohydrate consistent diet [Long Term Vascular Complications] : long term vascular complications of diabetes [Importance of Diet and Exercise] : importance of diet and exercise to improve glycemic control, achieve weight loss and improve cardiovascular health [Self Monitoring of Blood Glucose] : self monitoring of blood glucose [Action and use of Insulin] : action and use of short and long-acting insulin [Retinopathy Screening] : Patient was referred to ophthalmology for retinopathy screening

## 2019-11-12 NOTE — PHYSICAL EXAM
[Alert] : alert [Well Nourished] : well nourished [No Acute Distress] : no acute distress [Normal Sclera/Conjunctiva] : normal sclera/conjunctiva [Well Developed] : well developed [No Proptosis] : no proptosis [EOMI] : extra ocular movement intact [Thyroid Not Enlarged] : the thyroid was not enlarged [No Thyroid Nodules] : there were no palpable thyroid nodules [Normal Oropharynx] : the oropharynx was normal [No Respiratory Distress] : no respiratory distress [No Accessory Muscle Use] : no accessory muscle use [Normal Rate] : heart rate was normal  [Clear to Auscultation] : lungs were clear to auscultation bilaterally [Normal S1, S2] : normal S1 and S2 [Regular Rhythm] : with a regular rhythm [No Edema] : there was no peripheral edema [Not Tender] : non-tender [Normal Bowel Sounds] : normal bowel sounds [Not Distended] : not distended [Soft] : abdomen soft [Normal Strength/Tone] : muscle strength and tone were normal [Normal Gait] : normal gait [No Rash] : no rash [Normal Reflexes] : deep tendon reflexes were 2+ and symmetric [Acanthosis Nigricans] : no acanthosis nigricans [No Tremors] : no tremors [Oriented x3] : oriented to person, place, and time

## 2019-11-25 ENCOUNTER — APPOINTMENT (OUTPATIENT)
Dept: INTERNAL MEDICINE | Facility: AMBULATORY MEDICAL SERVICES | Age: 59
End: 2019-11-25
Payer: COMMERCIAL

## 2019-11-25 PROCEDURE — G0121 COLON CA SCRN NOT HI RSK IND: CPT | Mod: PT

## 2019-12-11 ENCOUNTER — APPOINTMENT (OUTPATIENT)
Dept: ENDOCRINOLOGY | Facility: CLINIC | Age: 59
End: 2019-12-11
Payer: COMMERCIAL

## 2019-12-11 VITALS
DIASTOLIC BLOOD PRESSURE: 85 MMHG | HEART RATE: 70 BPM | HEIGHT: 66 IN | OXYGEN SATURATION: 97 % | WEIGHT: 155 LBS | SYSTOLIC BLOOD PRESSURE: 145 MMHG | BODY MASS INDEX: 24.91 KG/M2

## 2019-12-11 LAB — GLUCOSE BLDC GLUCOMTR-MCNC: 126

## 2019-12-11 PROCEDURE — G0108 DIAB MANAGE TRN  PER INDIV: CPT

## 2019-12-11 PROCEDURE — 95251 CONT GLUC MNTR ANALYSIS I&R: CPT

## 2020-01-24 ENCOUNTER — APPOINTMENT (OUTPATIENT)
Dept: CARDIOLOGY | Facility: CLINIC | Age: 60
End: 2020-01-24

## 2020-02-04 ENCOUNTER — APPOINTMENT (OUTPATIENT)
Dept: ENDOCRINOLOGY | Facility: CLINIC | Age: 60
End: 2020-02-04
Payer: COMMERCIAL

## 2020-02-04 VITALS
OXYGEN SATURATION: 96 % | BODY MASS INDEX: 24.91 KG/M2 | SYSTOLIC BLOOD PRESSURE: 159 MMHG | HEIGHT: 66 IN | DIASTOLIC BLOOD PRESSURE: 100 MMHG | HEART RATE: 80 BPM | WEIGHT: 155 LBS

## 2020-02-04 LAB
GLUCOSE BLDC GLUCOMTR-MCNC: 131
HBA1C MFR BLD HPLC: 7.4

## 2020-02-04 PROCEDURE — 82962 GLUCOSE BLOOD TEST: CPT

## 2020-02-04 PROCEDURE — 99214 OFFICE O/P EST MOD 30 MIN: CPT | Mod: 25

## 2020-02-04 PROCEDURE — 95251 CONT GLUC MNTR ANALYSIS I&R: CPT

## 2020-02-04 PROCEDURE — 83036 HEMOGLOBIN GLYCOSYLATED A1C: CPT | Mod: QW

## 2020-02-04 NOTE — HISTORY OF PRESENT ILLNESS
[FreeTextEntry1] : Type 2 diabetes and has been using DexCom sensor. He has not had any significant low sugars; sugars are averaging around 183 and his A1C had increased from  7.3 to 7.9 but is now 7.4.  . He feels his diet is good but he does admit to being non- compliant with his insulin regimen., Toujeo 35 and mealtime , Apidra , he does take  Jardiance and metformin. \par

## 2020-02-04 NOTE — ASSESSMENT
[FreeTextEntry1] : Patient with moderately controlled type 2 diabetes. He promises to be more careful with his diet. He will remain on her current regimen of medication.He'll remain off alcohol. He will be more compliant with insulin,Usually takes Apidra only at dinner. \par We did review his sensor download which shows  TIR  53.7(54.4)( 65.7 )(76.4 %) worn  87.2 % of the time.  AVG glucose 183 and over 250 6.9(10.3)( 6.4  )He is generally highest in evening.  Low number can be in early morning hours. He will make sure to take Apidra before all meals, He also was instructed to take Toujeo 35 U  every day close to the same time. There has been a worsening of control on sensor which is most likely due to his missing his insulin doses\par BP high today forgot his med, will go home and take. \par f/u in 3 months. \par due for labs. Has f/u with eye doctor, [Carbohydrate Consistent Diet] : carbohydrate consistent diet [Diabetes Foot Care] : diabetes foot care [Long Term Vascular Complications] : long term vascular complications of diabetes [Retinopathy Screening] : Patient was referred to ophthalmology for retinopathy screening [Self Monitoring of Blood Glucose] : self monitoring of blood glucose [Importance of Diet and Exercise] : importance of diet and exercise to improve glycemic control, achieve weight loss and improve cardiovascular health

## 2020-02-04 NOTE — PHYSICAL EXAM
[No Acute Distress] : no acute distress [Alert] : alert [Well Developed] : well developed [Well Nourished] : well nourished [Normal Sclera/Conjunctiva] : normal sclera/conjunctiva [EOMI] : extra ocular movement intact [No Proptosis] : no proptosis [Thyroid Not Enlarged] : the thyroid was not enlarged [Normal Oropharynx] : the oropharynx was normal [No Thyroid Nodules] : there were no palpable thyroid nodules [No Respiratory Distress] : no respiratory distress [No Accessory Muscle Use] : no accessory muscle use [Clear to Auscultation] : lungs were clear to auscultation bilaterally [Normal S1, S2] : normal S1 and S2 [Normal Rate] : heart rate was normal  [No Edema] : there was no peripheral edema [Regular Rhythm] : with a regular rhythm [Normal Bowel Sounds] : normal bowel sounds [Not Tender] : non-tender [Soft] : abdomen soft [Not Distended] : not distended [Normal Strength/Tone] : muscle strength and tone were normal [Normal Gait] : normal gait [No Rash] : no rash [Acanthosis Nigricans] : no acanthosis nigricans [Vibration Dec.] : normal vibratory sensation at the level of the toes [Normal] : normal [Full ROM] : with full range of motion [Normal Reflexes] : deep tendon reflexes were 2+ and symmetric [Position Sense Dec.] : normal position sense at the level of the toes [Diminished Throughout Both Feet] : normal tactile sensation with monofilament testing throughout both feet [No Tremors] : no tremors [Oriented x3] : oriented to person, place, and time

## 2020-02-25 ENCOUNTER — RX RENEWAL (OUTPATIENT)
Age: 60
End: 2020-02-25

## 2020-03-02 ENCOUNTER — RX RENEWAL (OUTPATIENT)
Age: 60
End: 2020-03-02

## 2020-04-16 ENCOUNTER — RX RENEWAL (OUTPATIENT)
Age: 60
End: 2020-04-16

## 2020-05-01 ENCOUNTER — APPOINTMENT (OUTPATIENT)
Dept: UROLOGY | Facility: CLINIC | Age: 60
End: 2020-05-01

## 2020-05-27 ENCOUNTER — RX RENEWAL (OUTPATIENT)
Age: 60
End: 2020-05-27

## 2020-05-28 ENCOUNTER — RX RENEWAL (OUTPATIENT)
Age: 60
End: 2020-05-28

## 2020-06-01 ENCOUNTER — APPOINTMENT (OUTPATIENT)
Dept: ENDOCRINOLOGY | Facility: CLINIC | Age: 60
End: 2020-06-01

## 2020-06-08 ENCOUNTER — APPOINTMENT (OUTPATIENT)
Dept: FAMILY MEDICINE | Facility: CLINIC | Age: 60
End: 2020-06-08

## 2020-06-19 ENCOUNTER — RX RENEWAL (OUTPATIENT)
Age: 60
End: 2020-06-19

## 2020-07-31 ENCOUNTER — RX RENEWAL (OUTPATIENT)
Age: 60
End: 2020-07-31

## 2020-08-06 ENCOUNTER — APPOINTMENT (OUTPATIENT)
Dept: ENDOCRINOLOGY | Facility: CLINIC | Age: 60
End: 2020-08-06
Payer: COMMERCIAL

## 2020-08-06 VITALS
BODY MASS INDEX: 25.39 KG/M2 | HEIGHT: 66 IN | SYSTOLIC BLOOD PRESSURE: 170 MMHG | OXYGEN SATURATION: 96 % | WEIGHT: 158 LBS | DIASTOLIC BLOOD PRESSURE: 100 MMHG | HEART RATE: 73 BPM

## 2020-08-06 LAB
ALBUMIN SERPL ELPH-MCNC: 4.6 G/DL
ALP BLD-CCNC: 64 U/L
ALT SERPL-CCNC: 20 U/L
AMYLASE/CREAT SERPL: 156 U/L
ANION GAP SERPL CALC-SCNC: 14 MMOL/L
APPEARANCE: CLEAR
AST SERPL-CCNC: 16 U/L
BILIRUB SERPL-MCNC: 0.5 MG/DL
BILIRUBIN URINE: NEGATIVE
BLOOD URINE: NEGATIVE
BUN SERPL-MCNC: 15 MG/DL
CALCIUM SERPL-MCNC: 9.8 MG/DL
CHLORIDE SERPL-SCNC: 103 MMOL/L
CHOLEST SERPL-MCNC: 148 MG/DL
CHOLEST/HDLC SERPL: 3.2 RATIO
CO2 SERPL-SCNC: 23 MMOL/L
COLOR: NORMAL
CREAT SERPL-MCNC: 1.21 MG/DL
CREAT SPEC-SCNC: 97 MG/DL
GLUCOSE BLDC GLUCOMTR-MCNC: 152
GLUCOSE QUALITATIVE U: ABNORMAL
GLUCOSE SERPL-MCNC: 163 MG/DL
HBA1C MFR BLD HPLC: 9
HDLC SERPL-MCNC: 47 MG/DL
KETONES URINE: NEGATIVE
LDLC SERPL CALC-MCNC: 68 MG/DL
LEUKOCYTE ESTERASE URINE: NEGATIVE
LPL SERPL-CCNC: 120 U/L
MICROALBUMIN 24H UR DL<=1MG/L-MCNC: 1.8 MG/DL
MICROALBUMIN/CREAT 24H UR-RTO: 18 MG/G
NITRITE URINE: NEGATIVE
PH URINE: 6
POTASSIUM SERPL-SCNC: 4.4 MMOL/L
PROT SERPL-MCNC: 7.5 G/DL
PROTEIN URINE: NEGATIVE
SAVE SPECIMEN: NORMAL
SODIUM SERPL-SCNC: 140 MMOL/L
SPECIFIC GRAVITY URINE: 1.03
T4 FREE SERPL-MCNC: 1.2 NG/DL
T4 SERPL-MCNC: 5.6 UG/DL
TRIGL SERPL-MCNC: 168 MG/DL
TSH SERPL-ACNC: 1.66 UIU/ML
UROBILINOGEN URINE: NORMAL

## 2020-08-06 PROCEDURE — 95251 CONT GLUC MNTR ANALYSIS I&R: CPT

## 2020-08-06 PROCEDURE — 99214 OFFICE O/P EST MOD 30 MIN: CPT | Mod: 25

## 2020-08-06 PROCEDURE — 83036 HEMOGLOBIN GLYCOSYLATED A1C: CPT | Mod: QW

## 2020-08-06 NOTE — HISTORY OF PRESENT ILLNESS
[FreeTextEntry1] : Type 2 diabetes and has been using DexCom sensor. He has not had any significant low sugars; sugars are averaging around 189 and his  prior A1C had increased from  7.3 to 7.9 . he did gave the Covid illness in march.  . He feels his diet is good but he does admit to being non- compliant with his insulin regimen., Toujeo 35 and mealtime , Apidra , he does take  Jardiance and metformin. \par  [Dexcom] : Dexcom [FreeTextEntry2] : 52.1 [FreeTextEntry4] : 0 [FreeTextEntry3] : 47.9 [FreeTextEntry5] : 189 [FreeTextEntry6] : 27.4

## 2020-08-06 NOTE — PHYSICAL EXAM
[No Acute Distress] : no acute distress [Alert] : alert [Normal Sclera/Conjunctiva] : normal sclera/conjunctiva [Normal Voice/Communication] : normal voice communication [EOMI] : extra ocular movement intact [No Thyroid Nodules] : no palpable thyroid nodules [Thyroid Not Enlarged] : the thyroid was not enlarged [PERRL] : pupils equal, round and reactive to light [Clear to Auscultation] : lungs were clear to auscultation bilaterally [No Respiratory Distress] : no respiratory distress [Normal S1, S2] : normal S1 and S2 [Normal PMI] : the apical impulse was normal [Normal Rate] : heart rate was normal [No Edema] : no peripheral edema [Normal Bowel Sounds] : normal bowel sounds [Not Tender] : non-tender [Soft] : abdomen soft [Normal Gait] : normal gait [No Rash] : no rash [Oriented x3] : oriented to person, place, and time [No Joint Swelling] : no joint swelling seen [Normal Insight/Judgement] : insight and judgment were intact

## 2020-08-06 NOTE — ASSESSMENT
[FreeTextEntry1] : Patient with moderately controlled type 2 diabetes. He promises to be more careful with his diet. He will remain on her current regimen of medication.He'll remain off alcohol. He will be more compliant with insulin,Usually takes Apidra only at dinner. Will take before each meal, adjust dose based on carb content. will meet with RD to discuss. \par He was also was instructed to take Toujeo 35 U  every day close to the same time. There has been a worsening of control on sensor which is most likely due to his missing his insulin doses\par BP high today forgot his med, will go home and take. \par f/u in 3 months. \par  [Long Term Vascular Complications] : long term vascular complications of diabetes [Diabetes Foot Care] : diabetes foot care [Carbohydrate Consistent Diet] : carbohydrate consistent diet [Importance of Diet and Exercise] : importance of diet and exercise to improve glycemic control, achieve weight loss and improve cardiovascular health [Self Monitoring of Blood Glucose] : self monitoring of blood glucose [Retinopathy Screening] : Patient was referred to ophthalmology for retinopathy screening

## 2020-08-19 ENCOUNTER — RX RENEWAL (OUTPATIENT)
Age: 60
End: 2020-08-19

## 2020-11-03 ENCOUNTER — APPOINTMENT (OUTPATIENT)
Dept: ENDOCRINOLOGY | Facility: CLINIC | Age: 60
End: 2020-11-03
Payer: COMMERCIAL

## 2020-11-03 VITALS
DIASTOLIC BLOOD PRESSURE: 80 MMHG | HEART RATE: 66 BPM | BODY MASS INDEX: 25.07 KG/M2 | WEIGHT: 156 LBS | TEMPERATURE: 95.1 F | HEIGHT: 66 IN | SYSTOLIC BLOOD PRESSURE: 140 MMHG | OXYGEN SATURATION: 97 %

## 2020-11-03 LAB
GLUCOSE BLDC GLUCOMTR-MCNC: 161
HBA1C MFR BLD HPLC: 9.2

## 2020-11-03 PROCEDURE — 99213 OFFICE O/P EST LOW 20 MIN: CPT | Mod: 25

## 2020-11-03 PROCEDURE — 82962 GLUCOSE BLOOD TEST: CPT

## 2020-11-03 PROCEDURE — 83036 HEMOGLOBIN GLYCOSYLATED A1C: CPT | Mod: QW

## 2020-11-03 PROCEDURE — 95251 CONT GLUC MNTR ANALYSIS I&R: CPT

## 2020-11-03 PROCEDURE — 99072 ADDL SUPL MATRL&STAF TM PHE: CPT

## 2020-11-03 RX ORDER — ALFUZOSIN HYDROCHLORIDE 10 MG/1
10 TABLET, EXTENDED RELEASE ORAL
Qty: 30 | Refills: 0 | Status: ACTIVE | COMMUNITY
Start: 2020-09-21

## 2020-11-03 NOTE — HISTORY OF PRESENT ILLNESS
[FreeTextEntry1] : Type 2 diabetes and has been using DexCom sensor. He has not had any significant low sugars; sugars are averaging around 200 and his  prior A1C had increased to over 9. He did gave the Covid illness in march.. He feels his diet is good but he does admit to being non- compliant with his insulin regimen., Toujeo 35 and mealtime ,Apidra before meals usually take at dinner only and, he does take  Jardiance and metformin. \par

## 2020-11-03 NOTE — PHYSICAL EXAM
[Alert] : alert [No Acute Distress] : no acute distress [Normal Voice/Communication] : normal voice communication [Normal Sclera/Conjunctiva] : normal sclera/conjunctiva [EOMI] : extra ocular movement intact [PERRL] : pupils equal, round and reactive to light [Thyroid Not Enlarged] : the thyroid was not enlarged [No Thyroid Nodules] : no palpable thyroid nodules [No Respiratory Distress] : no respiratory distress [Clear to Auscultation] : lungs were clear to auscultation bilaterally [Normal PMI] : the apical impulse was normal [Normal S1, S2] : normal S1 and S2 [Normal Rate] : heart rate was normal [No Edema] : no peripheral edema [Normal Bowel Sounds] : normal bowel sounds [Not Tender] : non-tender [Soft] : abdomen soft [Normal Gait] : normal gait [No Joint Swelling] : no joint swelling seen [No Rash] : no rash [Oriented x3] : oriented to person, place, and time [Normal Insight/Judgement] : insight and judgment were intact

## 2020-11-03 NOTE — ASSESSMENT
[FreeTextEntry1] : Patient with now poorly controlled type 2 diabetes. He promises to be more careful with his diet. He will remain on her current regimen of medication.He'll remain off alcohol. He will be more compliant with insulin,Usually takes Apidra only at dinner. Will take before each meal, adjust dose based on carb content. will meet with CDE to discuss pump therapy. \par He was also was instructed to take Toujeo 40 U  every day close to the same time. There has been a worsening of control on sensor which is most likely due to his missing his insulin doses\par f/u in 3 months. \par to do labs  [Diabetes Foot Care] : diabetes foot care [Long Term Vascular Complications] : long term vascular complications of diabetes [Carbohydrate Consistent Diet] : carbohydrate consistent diet [Importance of Diet and Exercise] : importance of diet and exercise to improve glycemic control, achieve weight loss and improve cardiovascular health [Hypoglycemia Management] : hypoglycemia management [Self Monitoring of Blood Glucose] : self monitoring of blood glucose [Retinopathy Screening] : Patient was referred to ophthalmology for retinopathy screening [Diabetic Medications] : Risks and benefits of diabetic medications were discussed

## 2020-11-11 ENCOUNTER — RX RENEWAL (OUTPATIENT)
Age: 60
End: 2020-11-11

## 2020-12-06 ENCOUNTER — RX RENEWAL (OUTPATIENT)
Age: 60
End: 2020-12-06

## 2020-12-09 ENCOUNTER — APPOINTMENT (OUTPATIENT)
Dept: ENDOCRINOLOGY | Facility: CLINIC | Age: 60
End: 2020-12-09

## 2020-12-23 PROBLEM — Z12.11 ENCOUNTER FOR COLORECTAL CANCER SCREENING: Status: RESOLVED | Noted: 2019-09-09 | Resolved: 2020-12-23

## 2020-12-24 ENCOUNTER — APPOINTMENT (OUTPATIENT)
Dept: ENDOCRINOLOGY | Facility: CLINIC | Age: 60
End: 2020-12-24
Payer: COMMERCIAL

## 2020-12-24 VITALS
TEMPERATURE: 97.8 F | SYSTOLIC BLOOD PRESSURE: 158 MMHG | HEIGHT: 66 IN | DIASTOLIC BLOOD PRESSURE: 83 MMHG | WEIGHT: 163.8 LBS | OXYGEN SATURATION: 96 % | BODY MASS INDEX: 26.33 KG/M2 | HEART RATE: 71 BPM

## 2020-12-24 LAB — GLUCOSE BLDC GLUCOMTR-MCNC: 209

## 2020-12-24 PROCEDURE — 99072 ADDL SUPL MATRL&STAF TM PHE: CPT

## 2020-12-24 PROCEDURE — 95251 CONT GLUC MNTR ANALYSIS I&R: CPT

## 2020-12-24 PROCEDURE — G0108 DIAB MANAGE TRN  PER INDIV: CPT

## 2021-02-18 LAB
ALBUMIN SERPL ELPH-MCNC: 4.3 G/DL
ALP BLD-CCNC: 69 U/L
ALT SERPL-CCNC: 19 U/L
AMYLASE/CREAT SERPL: 122 U/L
ANION GAP SERPL CALC-SCNC: 20 MMOL/L
APPEARANCE: CLEAR
AST SERPL-CCNC: 21 U/L
BASOPHILS # BLD AUTO: 0.02 K/UL
BASOPHILS NFR BLD AUTO: 0.4 %
BILIRUB SERPL-MCNC: 0.4 MG/DL
BILIRUBIN URINE: NEGATIVE
BLOOD URINE: NEGATIVE
BUN SERPL-MCNC: 19 MG/DL
C PEPTIDE SERPL-MCNC: 1.3 NG/ML
CALCIUM SERPL-MCNC: 9.5 MG/DL
CHLORIDE SERPL-SCNC: 101 MMOL/L
CHOLEST SERPL-MCNC: 206 MG/DL
CO2 SERPL-SCNC: 18 MMOL/L
COLOR: NORMAL
CREAT SERPL-MCNC: 1.25 MG/DL
CREAT SPEC-SCNC: 105 MG/DL
EOSINOPHIL # BLD AUTO: 0.09 K/UL
EOSINOPHIL NFR BLD AUTO: 1.9 %
ESTIMATED AVERAGE GLUCOSE: 200 MG/DL
GLUCOSE QUALITATIVE U: ABNORMAL
GLUCOSE SERPL-MCNC: 166 MG/DL
HBA1C MFR BLD HPLC: 8.6 %
HCT VFR BLD CALC: 48.6 %
HDLC SERPL-MCNC: 55 MG/DL
HGB BLD-MCNC: 15.8 G/DL
IMM GRANULOCYTES NFR BLD AUTO: 0.2 %
KETONES URINE: ABNORMAL
LDLC SERPL CALC-MCNC: 101 MG/DL
LEUKOCYTE ESTERASE URINE: NEGATIVE
LPL SERPL-CCNC: 72 U/L
LYMPHOCYTES # BLD AUTO: 1.98 K/UL
LYMPHOCYTES NFR BLD AUTO: 41 %
MAN DIFF?: NORMAL
MCHC RBC-ENTMCNC: 30 PG
MCHC RBC-ENTMCNC: 32.5 GM/DL
MCV RBC AUTO: 92.2 FL
MICROALBUMIN 24H UR DL<=1MG/L-MCNC: 2 MG/DL
MICROALBUMIN/CREAT 24H UR-RTO: 19 MG/G
MONOCYTES # BLD AUTO: 0.42 K/UL
MONOCYTES NFR BLD AUTO: 8.7 %
NEUTROPHILS # BLD AUTO: 2.31 K/UL
NEUTROPHILS NFR BLD AUTO: 47.8 %
NITRITE URINE: NEGATIVE
NONHDLC SERPL-MCNC: 152 MG/DL
PH URINE: 6
PLATELET # BLD AUTO: 281 K/UL
POTASSIUM SERPL-SCNC: 4.3 MMOL/L
PROT SERPL-MCNC: 7.6 G/DL
PROTEIN URINE: NEGATIVE
RBC # BLD: 5.27 M/UL
RBC # FLD: 13.5 %
SAVE SPECIMEN: NORMAL
SODIUM SERPL-SCNC: 139 MMOL/L
SPECIFIC GRAVITY URINE: 1.03
T4 FREE SERPL-MCNC: 1.2 NG/DL
TRIGL SERPL-MCNC: 251 MG/DL
TSH SERPL-ACNC: 1.28 UIU/ML
UROBILINOGEN URINE: NORMAL
WBC # FLD AUTO: 4.83 K/UL

## 2021-02-22 ENCOUNTER — RX RENEWAL (OUTPATIENT)
Age: 61
End: 2021-02-22

## 2021-02-22 LAB
GAD65 AB SER-MCNC: 0 NMOL/L
PANC ISLET CELL AB SER QL: NORMAL

## 2021-03-02 RX ORDER — INSULIN GLULISINE 100 [IU]/ML
100 INJECTION, SOLUTION SUBCUTANEOUS
Qty: 1 | Refills: 2 | Status: DISCONTINUED | COMMUNITY
Start: 2018-03-14 | End: 2021-03-02

## 2021-03-02 RX ORDER — INSULIN LISPRO 100 [IU]/ML
100 INJECTION, SOLUTION INTRAVENOUS; SUBCUTANEOUS
Qty: 1 | Refills: 3 | Status: ACTIVE | COMMUNITY
Start: 2021-03-02

## 2021-03-08 ENCOUNTER — RX RENEWAL (OUTPATIENT)
Age: 61
End: 2021-03-08

## 2021-03-22 ENCOUNTER — RX RENEWAL (OUTPATIENT)
Age: 61
End: 2021-03-22

## 2021-03-29 ENCOUNTER — RX RENEWAL (OUTPATIENT)
Age: 61
End: 2021-03-29

## 2021-04-01 ENCOUNTER — APPOINTMENT (OUTPATIENT)
Dept: CARDIOLOGY | Facility: CLINIC | Age: 61
End: 2021-04-01
Payer: COMMERCIAL

## 2021-04-01 ENCOUNTER — NON-APPOINTMENT (OUTPATIENT)
Age: 61
End: 2021-04-01

## 2021-04-01 ENCOUNTER — APPOINTMENT (OUTPATIENT)
Dept: FAMILY MEDICINE | Facility: CLINIC | Age: 61
End: 2021-04-01
Payer: COMMERCIAL

## 2021-04-01 VITALS
SYSTOLIC BLOOD PRESSURE: 152 MMHG | WEIGHT: 153 LBS | HEIGHT: 66 IN | HEART RATE: 76 BPM | DIASTOLIC BLOOD PRESSURE: 90 MMHG | TEMPERATURE: 97.4 F | OXYGEN SATURATION: 97 % | BODY MASS INDEX: 24.59 KG/M2 | RESPIRATION RATE: 16 BRPM

## 2021-04-01 PROCEDURE — 99214 OFFICE O/P EST MOD 30 MIN: CPT

## 2021-04-01 PROCEDURE — 93000 ELECTROCARDIOGRAM COMPLETE: CPT

## 2021-04-01 PROCEDURE — 99072 ADDL SUPL MATRL&STAF TM PHE: CPT

## 2021-04-01 NOTE — REVIEW OF SYSTEMS
[Negative] : Respiratory [Fever] : no fever [Chills] : no chills [Shortness Of Breath] : no shortness of breath [Chest Pain] : no chest pain [Palpitations] : no palpitations [Cough] : no cough [Dizziness] : no dizziness [Convulsions] : no convulsions [Anxiety] : no anxiety

## 2021-04-01 NOTE — PHYSICAL EXAM
[Normal Appearance] : normal appearance [General Appearance - Well Developed] : well developed [Well Groomed] : well groomed [General Appearance - Well Nourished] : well nourished [No Deformities] : no deformities [General Appearance - In No Acute Distress] : no acute distress [Normal Conjunctiva] : the conjunctiva exhibited no abnormalities [Eyelids - No Xanthelasma] : the eyelids demonstrated no xanthelasmas [Normal Oral Mucosa] : normal oral mucosa [No Oral Pallor] : no oral pallor [No Oral Cyanosis] : no oral cyanosis [Normal Jugular Venous A Waves Present] : normal jugular venous A waves present [Normal Jugular Venous V Waves Present] : normal jugular venous V waves present [No Jugular Venous Miranda A Waves] : no jugular venous miranda A waves [Exaggerated Use Of Accessory Muscles For Inspiration] : no accessory muscle use [Respiration, Rhythm And Depth] : normal respiratory rhythm and effort [Auscultation Breath Sounds / Voice Sounds] : lungs were clear to auscultation bilaterally [Heart Rate And Rhythm] : heart rate and rhythm were normal [Heart Sounds] : normal S1 and S2 [Murmurs] : no murmurs present [Edema] : no peripheral edema present [Abdomen Soft] : soft [Abdomen Tenderness] : non-tender [Abdomen Mass (___ Cm)] : no abdominal mass palpated [Abnormal Walk] : normal gait [Gait - Sufficient For Exercise Testing] : the gait was sufficient for exercise testing [Nail Clubbing] : no clubbing of the fingernails [Cyanosis, Localized] : no localized cyanosis [Petechial Hemorrhages (___cm)] : no petechial hemorrhages [Skin Color & Pigmentation] : normal skin color and pigmentation [] : no rash [No Venous Stasis] : no venous stasis [Skin Lesions] : no skin lesions [No Skin Ulcers] : no skin ulcer [No Xanthoma] : no  xanthoma was observed [Oriented To Time, Place, And Person] : oriented to person, place, and time [Affect] : the affect was normal [Mood] : the mood was normal [No Anxiety] : not feeling anxious

## 2021-04-01 NOTE — HISTORY OF PRESENT ILLNESS
[FreeTextEntry1] : 61M with DM (8.6%), HTN, HLD who presents for follow up of chronic conditions. \par \par Last seen Oct 2019\par Got COVID in March 2020, fatigue was only symptom\par Since then has felt well\par No CP, no SOB, no dizziness, no lightheadedness\par a1c had been up to 9.2%, now down to 8.6%\par Compliant with medications 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 \par \par ECG: SR, no ST-T wave changes\par Lipids: 159/56/64/197\par EST: 9 mins, no ischemia, DTS 9\par Carotid 2019: Mild bilateral disease\par \par Cardiac meds: Amlodipine 10mg, Lipitor 40mg, Bystolic 5mg daily, Losartan 100mg, asa 81mg, Jardiance, metformin, insulin

## 2021-04-01 NOTE — DISCUSSION/SUMMARY
[FreeTextEntry1] : 61M DM, HTN, HLD \par \par 1. HTN: BP running high today. Admits to intermittent noncompliance. Enforced importance of daily compliance\par \par Cont Meds\par Low salt diet\par \par 2. HLD: Lipids increased since last check. Admits to noncompliance. Ensure daily compliance\par \par 3. DM: A1C 8.6%, management as per endo.  Had been better in past but overall improving \par \par Check echo for hx of COVID\par \par RV 3 months

## 2021-04-29 ENCOUNTER — APPOINTMENT (OUTPATIENT)
Dept: ENDOCRINOLOGY | Facility: CLINIC | Age: 61
End: 2021-04-29
Payer: COMMERCIAL

## 2021-04-29 VITALS
BODY MASS INDEX: 24.75 KG/M2 | HEART RATE: 67 BPM | SYSTOLIC BLOOD PRESSURE: 130 MMHG | WEIGHT: 154 LBS | DIASTOLIC BLOOD PRESSURE: 81 MMHG | OXYGEN SATURATION: 94 % | TEMPERATURE: 96.4 F | HEIGHT: 66 IN

## 2021-04-29 LAB
GLUCOSE BLDC GLUCOMTR-MCNC: 140
HBA1C MFR BLD HPLC: 8

## 2021-04-29 PROCEDURE — 83036 HEMOGLOBIN GLYCOSYLATED A1C: CPT | Mod: QW

## 2021-04-29 PROCEDURE — 99213 OFFICE O/P EST LOW 20 MIN: CPT | Mod: 25

## 2021-04-29 PROCEDURE — 82962 GLUCOSE BLOOD TEST: CPT

## 2021-04-29 PROCEDURE — 99072 ADDL SUPL MATRL&STAF TM PHE: CPT

## 2021-04-29 NOTE — HISTORY OF PRESENT ILLNESS
[FreeTextEntry1] : Type 2 diabetes and has been using DexCom sensor. He has not had any significant low sugars; sugars are averaging around 200 and his  prior A1C had increased to over 9. He did gave the Covid illness in march 2020.. He feels his diet is good but he does admit to being non- compliant with his insulin regimen., Toujeo 35 and mealtime Humalog  before meals usually take at dinner only and, he does take  Jardiance and metformin. \par He did have the vaccine for Covid.

## 2021-04-29 NOTE — ASSESSMENT
[FreeTextEntry1] : Patient with now poorly controlled type 2 diabetes. He promises to be more careful with his diet. He will remain on her current regimen of medication.He'll remain off alcohol. He will be more compliant with insulin,Usually takes Humalog  only at dinner. Will take before each meal, adjust dose based on carb content. will meet with CDE to discuss  use of In Pen; will order early June since has Humalog pens now.   Was not able to download DexCom sensor today. \par He was also was instructed to take Toujeo 40 U  every day close to the same time. There has been a worsening of control on sensor which is most likely due to his missing his insulin doses\par f/u in 2 months. \par to do labs  in July.  [Diabetes Foot Care] : diabetes foot care [Long Term Vascular Complications] : long term vascular complications of diabetes [Carbohydrate Consistent Diet] : carbohydrate consistent diet [Importance of Diet and Exercise] : importance of diet and exercise to improve glycemic control, achieve weight loss and improve cardiovascular health [Hypoglycemia Management] : hypoglycemia management [Self Monitoring of Blood Glucose] : self monitoring of blood glucose [Retinopathy Screening] : Patient was referred to ophthalmology for retinopathy screening

## 2021-05-14 ENCOUNTER — APPOINTMENT (OUTPATIENT)
Dept: INTERNAL MEDICINE | Facility: CLINIC | Age: 61
End: 2021-05-14
Payer: COMMERCIAL

## 2021-05-14 PROCEDURE — 93306 TTE W/DOPPLER COMPLETE: CPT

## 2021-05-14 PROCEDURE — 99072 ADDL SUPL MATRL&STAF TM PHE: CPT

## 2021-06-14 ENCOUNTER — APPOINTMENT (OUTPATIENT)
Dept: ENDOCRINOLOGY | Facility: CLINIC | Age: 61
End: 2021-06-14
Payer: COMMERCIAL

## 2021-06-14 VITALS
HEIGHT: 66 IN | TEMPERATURE: 97.1 F | SYSTOLIC BLOOD PRESSURE: 146 MMHG | DIASTOLIC BLOOD PRESSURE: 110 MMHG | WEIGHT: 158.25 LBS | BODY MASS INDEX: 25.43 KG/M2 | OXYGEN SATURATION: 97 % | HEART RATE: 70 BPM

## 2021-06-14 LAB — GLUCOSE BLDC GLUCOMTR-MCNC: 147

## 2021-06-14 PROCEDURE — G0108 DIAB MANAGE TRN  PER INDIV: CPT

## 2021-06-14 PROCEDURE — 82962 GLUCOSE BLOOD TEST: CPT

## 2021-06-28 ENCOUNTER — APPOINTMENT (OUTPATIENT)
Dept: ENDOCRINOLOGY | Facility: CLINIC | Age: 61
End: 2021-06-28
Payer: COMMERCIAL

## 2021-06-28 ENCOUNTER — RX RENEWAL (OUTPATIENT)
Age: 61
End: 2021-06-28

## 2021-06-28 VITALS
BODY MASS INDEX: 24.11 KG/M2 | HEART RATE: 73 BPM | OXYGEN SATURATION: 98 % | DIASTOLIC BLOOD PRESSURE: 80 MMHG | WEIGHT: 150 LBS | HEIGHT: 66 IN | TEMPERATURE: 98.7 F | SYSTOLIC BLOOD PRESSURE: 180 MMHG

## 2021-06-28 PROCEDURE — 82962 GLUCOSE BLOOD TEST: CPT

## 2021-06-28 PROCEDURE — G0108 DIAB MANAGE TRN  PER INDIV: CPT

## 2021-06-30 ENCOUNTER — LABORATORY RESULT (OUTPATIENT)
Age: 61
End: 2021-06-30

## 2021-07-01 ENCOUNTER — RESULT CHARGE (OUTPATIENT)
Age: 61
End: 2021-07-01

## 2021-07-01 LAB — GLUCOSE BLDC GLUCOMTR-MCNC: 132

## 2021-07-08 ENCOUNTER — APPOINTMENT (OUTPATIENT)
Dept: CARDIOLOGY | Facility: CLINIC | Age: 61
End: 2021-07-08
Payer: COMMERCIAL

## 2021-07-08 VITALS
BODY MASS INDEX: 24.91 KG/M2 | HEIGHT: 66 IN | RESPIRATION RATE: 12 BRPM | HEART RATE: 68 BPM | DIASTOLIC BLOOD PRESSURE: 68 MMHG | SYSTOLIC BLOOD PRESSURE: 126 MMHG | WEIGHT: 155 LBS

## 2021-07-08 PROCEDURE — 99213 OFFICE O/P EST LOW 20 MIN: CPT

## 2021-07-08 NOTE — HISTORY OF PRESENT ILLNESS
[FreeTextEntry1] : 61M with DM, HTN, HLD who presents for follow up of chronic conditions. 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 \par \par ECG: SR, no ST-T wave changes\par TTE: 65-70%, normal \par Lipids: 159/56/64/197\par EST: 9 mins, no ischemia, DTS 9\par Carotid 2019: Mild bilateral disease\par \par Cardiac meds: Amlodipine 10mg, Lipitor 40mg, Bystolic 5mg daily, Losartan 100mg, asa 81mg, Jardiance, metformin, insulin

## 2021-07-08 NOTE — DISCUSSION/SUMMARY
[FreeTextEntry1] : 61M DM, HTN, HLD \par \par 1. HTN: BP great today\par \par Cont Meds\par Low salt diet\par Enforced med compliance\par \par 2. HLD: Taking meds daily, trend lipids, AST/ALT \par \par 3. DM: A1C 8.0%, management as per endo.  Improving overall, 126 today on dexcom\par \par RV 6 months

## 2021-07-13 ENCOUNTER — APPOINTMENT (OUTPATIENT)
Dept: ENDOCRINOLOGY | Facility: CLINIC | Age: 61
End: 2021-07-13
Payer: COMMERCIAL

## 2021-07-13 VITALS
HEART RATE: 69 BPM | HEIGHT: 66 IN | BODY MASS INDEX: 25.23 KG/M2 | WEIGHT: 157 LBS | SYSTOLIC BLOOD PRESSURE: 160 MMHG | OXYGEN SATURATION: 99 % | DIASTOLIC BLOOD PRESSURE: 90 MMHG | TEMPERATURE: 98 F

## 2021-07-13 LAB
GLUCOSE BLDC GLUCOMTR-MCNC: 92
HBA1C MFR BLD HPLC: 7.6

## 2021-07-13 PROCEDURE — 83036 HEMOGLOBIN GLYCOSYLATED A1C: CPT | Mod: QW

## 2021-07-13 PROCEDURE — 82962 GLUCOSE BLOOD TEST: CPT

## 2021-07-13 PROCEDURE — 95251 CONT GLUC MNTR ANALYSIS I&R: CPT

## 2021-07-13 PROCEDURE — 99214 OFFICE O/P EST MOD 30 MIN: CPT | Mod: 25

## 2021-07-13 NOTE — HISTORY OF PRESENT ILLNESS
[FreeTextEntry1] : Type 2 diabetes and has been using DexCom sensor. He has not had any significant low sugars; sugars are averaging around 150  and his  prior A1C had increased to over 9.It is now 7.9 since he started using the In Pen which he loves,  He did have the Covid illness in march 2020.. He feels his diet is good but he does admit to being non- compliant with his insulin regimen but better since using the In Pen and duglas. ., Toujeo 35 and mealtime Humalog  before meals . he does take  Jardiance and metformin. \par He did have the vaccine for Covid.  [Continuous Glucose Monitoring] : Continuous Glucose Monitoring: Yes [Dexcom] : Dexcom [FreeTextEntry2] : 73.6 [FreeTextEntry3] : 25.7 [FreeTextEntry4] : 0.6 [FreeTextEntry5] : 153 [FreeTextEntry6] : 30.2

## 2021-07-13 NOTE — ASSESSMENT
[FreeTextEntry1] : Patient with improved control  type 2 diabetes. He promises to be more careful with his diet. He will remain on her current regimen of medication.He'll remain off alcohol. He will be more compliant with insulin especially since using the Humalog  In pen with duglas.  Using target 120 CF 50 and Carb  15:1. \par He was also was instructed to take Toujeo 40 U  every day close to the same time. Prior  worsening of control on sensor which is most likely due to his missing his insulin doses\par f/u in 3 months. and CDE in 6 weeks. \par to do labs  in July.  [Diabetes Foot Care] : diabetes foot care [Long Term Vascular Complications] : long term vascular complications of diabetes [Importance of Diet and Exercise] : importance of diet and exercise to improve glycemic control, achieve weight loss and improve cardiovascular health [Hypoglycemia Management] : hypoglycemia management [Retinopathy Screening] : Patient was referred to ophthalmology for retinopathy screening [Diabetic Medications] : Risks and benefits of diabetic medications were discussed

## 2021-07-17 ENCOUNTER — RX RENEWAL (OUTPATIENT)
Age: 61
End: 2021-07-17

## 2021-10-03 ENCOUNTER — RX RENEWAL (OUTPATIENT)
Age: 61
End: 2021-10-03

## 2021-10-07 LAB
ALBUMIN SERPL ELPH-MCNC: 4.4 G/DL
ALP BLD-CCNC: 68 U/L
ALT SERPL-CCNC: 27 U/L
ANION GAP SERPL CALC-SCNC: 14 MMOL/L
APPEARANCE: CLEAR
AST SERPL-CCNC: 18 U/L
BILIRUB SERPL-MCNC: 0.3 MG/DL
BILIRUBIN URINE: NEGATIVE
BLOOD URINE: ABNORMAL
BUN SERPL-MCNC: 16 MG/DL
CALCIUM SERPL-MCNC: 9.5 MG/DL
CHLORIDE SERPL-SCNC: 106 MMOL/L
CHOLEST SERPL-MCNC: 165 MG/DL
CO2 SERPL-SCNC: 24 MMOL/L
COLOR: NORMAL
CREAT SERPL-MCNC: 1.1 MG/DL
CREAT SPEC-SCNC: 69 MG/DL
ESTIMATED AVERAGE GLUCOSE: 192 MG/DL
GLUCOSE QUALITATIVE U: ABNORMAL
GLUCOSE SERPL-MCNC: 122 MG/DL
HBA1C MFR BLD HPLC: 8.3 %
HDLC SERPL-MCNC: 55 MG/DL
KETONES URINE: NEGATIVE
LDLC SERPL CALC-MCNC: 90 MG/DL
LEUKOCYTE ESTERASE URINE: NEGATIVE
MICROALBUMIN 24H UR DL<=1MG/L-MCNC: 3.1 MG/DL
MICROALBUMIN/CREAT 24H UR-RTO: 45 MG/G
NITRITE URINE: NEGATIVE
NONHDLC SERPL-MCNC: 110 MG/DL
PH URINE: 6
POTASSIUM SERPL-SCNC: 4.6 MMOL/L
PROT SERPL-MCNC: 7.4 G/DL
PROTEIN URINE: NORMAL
SODIUM SERPL-SCNC: 143 MMOL/L
SPECIFIC GRAVITY URINE: 1.02
TRIGL SERPL-MCNC: 102 MG/DL
TSH SERPL-ACNC: 1.4 UIU/ML
UROBILINOGEN URINE: NORMAL

## 2021-10-14 ENCOUNTER — APPOINTMENT (OUTPATIENT)
Dept: FAMILY MEDICINE | Facility: CLINIC | Age: 61
End: 2021-10-14
Payer: COMMERCIAL

## 2021-10-14 VITALS
OXYGEN SATURATION: 98 % | BODY MASS INDEX: 24.11 KG/M2 | TEMPERATURE: 97.5 F | WEIGHT: 150 LBS | DIASTOLIC BLOOD PRESSURE: 80 MMHG | HEART RATE: 91 BPM | HEIGHT: 66 IN | SYSTOLIC BLOOD PRESSURE: 130 MMHG

## 2021-10-14 PROCEDURE — 99396 PREV VISIT EST AGE 40-64: CPT | Mod: 25

## 2021-10-15 LAB
25(OH)D3 SERPL-MCNC: 20.4 NG/ML
BASOPHILS # BLD AUTO: 0.04 K/UL
BASOPHILS NFR BLD AUTO: 0.6 %
EOSINOPHIL # BLD AUTO: 0.12 K/UL
EOSINOPHIL NFR BLD AUTO: 1.9 %
HCT VFR BLD CALC: 46.6 %
HGB BLD-MCNC: 15 G/DL
IMM GRANULOCYTES NFR BLD AUTO: 0.2 %
LYMPHOCYTES # BLD AUTO: 1.92 K/UL
LYMPHOCYTES NFR BLD AUTO: 31.1 %
MAN DIFF?: NORMAL
MCHC RBC-ENTMCNC: 30.4 PG
MCHC RBC-ENTMCNC: 32.2 GM/DL
MCV RBC AUTO: 94.3 FL
MONOCYTES # BLD AUTO: 0.44 K/UL
MONOCYTES NFR BLD AUTO: 7.1 %
NEUTROPHILS # BLD AUTO: 3.65 K/UL
NEUTROPHILS NFR BLD AUTO: 59.1 %
PLATELET # BLD AUTO: 315 K/UL
PSA SERPL-MCNC: 1 NG/ML
RBC # BLD: 4.94 M/UL
RBC # FLD: 13.5 %
WBC # FLD AUTO: 6.18 K/UL

## 2021-10-16 LAB
CHOLEST SERPL-MCNC: 171 MG/DL
HDLC SERPL-MCNC: 55 MG/DL
LDLC SERPL CALC-MCNC: 72 MG/DL
NONHDLC SERPL-MCNC: 117 MG/DL
TRIGL SERPL-MCNC: 223 MG/DL

## 2021-10-27 ENCOUNTER — APPOINTMENT (OUTPATIENT)
Dept: ENDOCRINOLOGY | Facility: CLINIC | Age: 61
End: 2021-10-27
Payer: COMMERCIAL

## 2021-10-27 VITALS
DIASTOLIC BLOOD PRESSURE: 84 MMHG | HEART RATE: 95 BPM | WEIGHT: 157 LBS | OXYGEN SATURATION: 95 % | SYSTOLIC BLOOD PRESSURE: 152 MMHG | HEIGHT: 66 IN | BODY MASS INDEX: 25.23 KG/M2

## 2021-10-27 LAB — GLUCOSE BLDC GLUCOMTR-MCNC: 229

## 2021-10-27 PROCEDURE — 82962 GLUCOSE BLOOD TEST: CPT

## 2021-10-27 PROCEDURE — G0108 DIAB MANAGE TRN  PER INDIV: CPT

## 2021-11-01 ENCOUNTER — APPOINTMENT (OUTPATIENT)
Dept: ENDOCRINOLOGY | Facility: CLINIC | Age: 61
End: 2021-11-01
Payer: COMMERCIAL

## 2021-11-01 VITALS
WEIGHT: 156 LBS | HEART RATE: 88 BPM | DIASTOLIC BLOOD PRESSURE: 86 MMHG | SYSTOLIC BLOOD PRESSURE: 166 MMHG | OXYGEN SATURATION: 96 % | HEIGHT: 66 IN | BODY MASS INDEX: 25.07 KG/M2

## 2021-11-01 LAB — GLUCOSE BLDC GLUCOMTR-MCNC: 151

## 2021-11-01 PROCEDURE — 95251 CONT GLUC MNTR ANALYSIS I&R: CPT

## 2021-11-01 PROCEDURE — 99214 OFFICE O/P EST MOD 30 MIN: CPT | Mod: 25

## 2021-11-01 NOTE — ASSESSMENT
[FreeTextEntry1] : Patient with improved control  type 2 diabetes. He promises to be more careful with his diet. He will remain on her current regimen of medication.He'll remain off alcohol. He will be more compliant with insulin especially since using the Humalog  In pen with duglas.  Using target 120 CF 50 and Carb  15:1. \par He was also was instructed to take Toujeo 40 U  every day close to the same time. Prior  worsening of control on sensor which is most likely due to his missing his insulin doses\par f/u in 3 months. and CDE in 6 weeks. \par to do labs  in  3 months  [Diabetes Foot Care] : diabetes foot care [Long Term Vascular Complications] : long term vascular complications of diabetes [Carbohydrate Consistent Diet] : carbohydrate consistent diet [Importance of Diet and Exercise] : importance of diet and exercise to improve glycemic control, achieve weight loss and improve cardiovascular health [Hypoglycemia Management] : hypoglycemia management [Self Monitoring of Blood Glucose] : self monitoring of blood glucose [Retinopathy Screening] : Patient was referred to ophthalmology for retinopathy screening [Diabetic Medications] : Risks and benefits of diabetic medications were discussed

## 2021-11-01 NOTE — HISTORY OF PRESENT ILLNESS
[FreeTextEntry1] : Type 2 diabetes and has been using DexCom sensor. He has not had any significant low sugars; sugars are averaging around 150- 180   and his  prior A1C had increased to over 9.It is now 8.3  since he started using the In Pen which he loves,  He did have the Covid illness in march 2020.. He feels his diet is good but he does admit to being non- compliant with his insulin regimen but better since using the In Pen and duglas. ., Toujeo 40 and mealtime Humalog  before meals . he does take  Jardiance and metformin. \par He did have the vaccine for Covid.  [Continuous Glucose Monitoring] : Continuous Glucose Monitoring: Yes [Dexcom] : Dexcom [FreeTextEntry2] : 55 [FreeTextEntry3] : 45 [FreeTextEntry4] : 0 [FreeTextEntry5] : 184 [FreeTextEntry7] : 55

## 2021-12-13 ENCOUNTER — APPOINTMENT (OUTPATIENT)
Dept: ENDOCRINOLOGY | Facility: CLINIC | Age: 61
End: 2021-12-13

## 2021-12-14 ENCOUNTER — APPOINTMENT (OUTPATIENT)
Dept: ENDOCRINOLOGY | Facility: CLINIC | Age: 61
End: 2021-12-14
Payer: COMMERCIAL

## 2021-12-14 VITALS
HEIGHT: 66 IN | BODY MASS INDEX: 25.71 KG/M2 | SYSTOLIC BLOOD PRESSURE: 162 MMHG | OXYGEN SATURATION: 100 % | HEART RATE: 96 BPM | DIASTOLIC BLOOD PRESSURE: 90 MMHG | WEIGHT: 160 LBS

## 2021-12-14 LAB — GLUCOSE BLDC GLUCOMTR-MCNC: 182

## 2021-12-14 PROCEDURE — 82962 GLUCOSE BLOOD TEST: CPT

## 2021-12-14 PROCEDURE — G0108 DIAB MANAGE TRN  PER INDIV: CPT

## 2021-12-14 PROCEDURE — 36416 COLLJ CAPILLARY BLOOD SPEC: CPT

## 2022-01-03 ENCOUNTER — RX RENEWAL (OUTPATIENT)
Age: 62
End: 2022-01-03

## 2022-01-06 ENCOUNTER — APPOINTMENT (OUTPATIENT)
Dept: CARDIOLOGY | Facility: CLINIC | Age: 62
End: 2022-01-06
Payer: COMMERCIAL

## 2022-01-06 VITALS
HEIGHT: 66 IN | BODY MASS INDEX: 25.71 KG/M2 | WEIGHT: 160 LBS | SYSTOLIC BLOOD PRESSURE: 152 MMHG | DIASTOLIC BLOOD PRESSURE: 90 MMHG | HEART RATE: 90 BPM

## 2022-01-06 PROCEDURE — 99213 OFFICE O/P EST LOW 20 MIN: CPT

## 2022-01-06 NOTE — DISCUSSION/SUMMARY
[FreeTextEntry1] : 61M DM, HTN, HLD \par \par 1. HTN: BP elevated today- under stress due to recent passing of his sister and did not yet take meds today\par \par Cont Meds\par Low salt diet\par Enforced med compliance\par \par 2. HLD: Taking meds daily, trend lipids, AST/ALT. TG slightly high, otherwise lipids good \par \par 3. DM: A1C 8.3%, management as per endo.\par \par RV 6 months

## 2022-01-06 NOTE — HISTORY OF PRESENT ILLNESS
[FreeTextEntry1] : 61M with DM, HTN, HLD who presents for follow up of chronic conditions. \par \par He just found out that his sister passed away so he is distraught by that\par He physically feels well\par Denies CP, dyspnea, lightheadedness\par Last a1c 8.3%\par Did not take BP pills yet today\par \par Had COVID in March 2020, fatigue was only symptom\par \par Nonsmoker. Denies family hx of CAD.  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

## 2022-01-13 ENCOUNTER — APPOINTMENT (OUTPATIENT)
Dept: ENDOCRINOLOGY | Facility: CLINIC | Age: 62
End: 2022-01-13

## 2022-02-07 ENCOUNTER — APPOINTMENT (OUTPATIENT)
Dept: ENDOCRINOLOGY | Facility: CLINIC | Age: 62
End: 2022-02-07
Payer: COMMERCIAL

## 2022-02-07 VITALS
OXYGEN SATURATION: 91 % | HEART RATE: 86 BPM | BODY MASS INDEX: 24.75 KG/M2 | DIASTOLIC BLOOD PRESSURE: 113 MMHG | HEIGHT: 66 IN | SYSTOLIC BLOOD PRESSURE: 170 MMHG | WEIGHT: 154 LBS

## 2022-02-07 DIAGNOSIS — Z86.16 PERSONAL HISTORY OF COVID-19: ICD-10-CM

## 2022-02-07 LAB
GLUCOSE BLDC GLUCOMTR-MCNC: 166
HBA1C MFR BLD HPLC: 8.9

## 2022-02-07 PROCEDURE — 99214 OFFICE O/P EST MOD 30 MIN: CPT | Mod: 25

## 2022-02-07 PROCEDURE — 95251 CONT GLUC MNTR ANALYSIS I&R: CPT

## 2022-02-07 PROCEDURE — 82962 GLUCOSE BLOOD TEST: CPT

## 2022-02-07 PROCEDURE — 83036 HEMOGLOBIN GLYCOSYLATED A1C: CPT | Mod: QW

## 2022-02-07 NOTE — HISTORY OF PRESENT ILLNESS
[FreeTextEntry1] : Type 2 diabetes and has been using DexCom sensor. He has not had any significant low sugars; sugars are averaging around 150- 180   and his  prior A1c had increased to over 9.It is now 8.3  since he started using the In Pen which he loves,  He did have the Covid illness in march 2020.. He feels his diet is good but he does admit to being non- compliant with his insulin regimen but better since using the In Pen and duglas. ., Toujeo 40 and mealtime Humalog  before meals . he does take  Jardiance and metformin. \par He did have the vaccine for Covid.  [Continuous Glucose Monitoring] : Continuous Glucose Monitoring: Yes [Dexcom] : Dexcom [FreeTextEntry2] : 16 [FreeTextEntry3] : 56+28 [FreeTextEntry4] : 0 [de-identified] : N/A [FreeTextEntry5] : 224 [FreeTextEntry7] : 45

## 2022-02-07 NOTE — ASSESSMENT
[FreeTextEntry1] : Patient with improved control  type 2 diabetes. He promises to be more careful with his diet. He will remain on her current regimen of medication.He'll remain off alcohol. He will be more compliant with insulin especially since using the Humalog  In pen with duglas.  Rarely uses Humalog. Using target 120 CF 50 and Carb  15:1. \par He was also was instructed to take Toujeo 40 U  every day close to the same time. Worsening of control on sensor which is most likely due to his missing his insulin doses\par f/u in 3 months. and CDE in 6 weeks.  \par to do labs  in  3 months  [Diabetes Foot Care] : diabetes foot care [Long Term Vascular Complications] : long term vascular complications of diabetes [Carbohydrate Consistent Diet] : carbohydrate consistent diet [Importance of Diet and Exercise] : importance of diet and exercise to improve glycemic control, achieve weight loss and improve cardiovascular health [Hypoglycemia Management] : hypoglycemia management [Self Monitoring of Blood Glucose] : self monitoring of blood glucose [Retinopathy Screening] : Patient was referred to ophthalmology for retinopathy screening [Diabetic Medications] : Risks and benefits of diabetic medications were discussed

## 2022-02-26 ENCOUNTER — LABORATORY RESULT (OUTPATIENT)
Age: 62
End: 2022-02-26

## 2022-03-21 ENCOUNTER — APPOINTMENT (OUTPATIENT)
Dept: ENDOCRINOLOGY | Facility: CLINIC | Age: 62
End: 2022-03-21
Payer: COMMERCIAL

## 2022-03-21 VITALS
WEIGHT: 158.25 LBS | HEART RATE: 89 BPM | DIASTOLIC BLOOD PRESSURE: 109 MMHG | BODY MASS INDEX: 25.43 KG/M2 | HEIGHT: 66 IN | SYSTOLIC BLOOD PRESSURE: 206 MMHG | OXYGEN SATURATION: 96 %

## 2022-03-21 LAB — GLUCOSE BLDC GLUCOMTR-MCNC: 181

## 2022-03-21 PROCEDURE — G0108 DIAB MANAGE TRN  PER INDIV: CPT

## 2022-03-21 PROCEDURE — 82962 GLUCOSE BLOOD TEST: CPT

## 2022-04-08 ENCOUNTER — RX RENEWAL (OUTPATIENT)
Age: 62
End: 2022-04-08

## 2022-04-14 ENCOUNTER — APPOINTMENT (OUTPATIENT)
Dept: FAMILY MEDICINE | Facility: CLINIC | Age: 62
End: 2022-04-14
Payer: COMMERCIAL

## 2022-04-14 VITALS
HEART RATE: 76 BPM | HEIGHT: 66 IN | BODY MASS INDEX: 24.75 KG/M2 | OXYGEN SATURATION: 97 % | SYSTOLIC BLOOD PRESSURE: 160 MMHG | DIASTOLIC BLOOD PRESSURE: 90 MMHG | WEIGHT: 154 LBS

## 2022-04-14 PROCEDURE — 99214 OFFICE O/P EST MOD 30 MIN: CPT | Mod: 25

## 2022-04-18 ENCOUNTER — RX RENEWAL (OUTPATIENT)
Age: 62
End: 2022-04-18

## 2022-04-18 ENCOUNTER — APPOINTMENT (OUTPATIENT)
Dept: ENDOCRINOLOGY | Facility: CLINIC | Age: 62
End: 2022-04-18
Payer: COMMERCIAL

## 2022-04-18 VITALS
OXYGEN SATURATION: 95 % | HEART RATE: 80 BPM | WEIGHT: 159 LBS | BODY MASS INDEX: 25.55 KG/M2 | HEIGHT: 66 IN | DIASTOLIC BLOOD PRESSURE: 81 MMHG | SYSTOLIC BLOOD PRESSURE: 167 MMHG

## 2022-04-18 LAB — GLUCOSE BLDC GLUCOMTR-MCNC: 182

## 2022-04-18 PROCEDURE — 82962 GLUCOSE BLOOD TEST: CPT

## 2022-04-18 PROCEDURE — G0108 DIAB MANAGE TRN  PER INDIV: CPT

## 2022-04-21 ENCOUNTER — RX RENEWAL (OUTPATIENT)
Age: 62
End: 2022-04-21

## 2022-04-25 ENCOUNTER — RX RENEWAL (OUTPATIENT)
Age: 62
End: 2022-04-25

## 2022-05-23 ENCOUNTER — APPOINTMENT (OUTPATIENT)
Dept: ENDOCRINOLOGY | Facility: CLINIC | Age: 62
End: 2022-05-23
Payer: COMMERCIAL

## 2022-05-23 VITALS
WEIGHT: 160.38 LBS | SYSTOLIC BLOOD PRESSURE: 178 MMHG | HEIGHT: 66 IN | HEART RATE: 77 BPM | OXYGEN SATURATION: 94 % | DIASTOLIC BLOOD PRESSURE: 93 MMHG | BODY MASS INDEX: 25.78 KG/M2

## 2022-05-23 LAB
GLUCOSE BLDC GLUCOMTR-MCNC: 96
HBA1C MFR BLD HPLC: 7

## 2022-05-23 PROCEDURE — 95251 CONT GLUC MNTR ANALYSIS I&R: CPT

## 2022-05-23 PROCEDURE — 82962 GLUCOSE BLOOD TEST: CPT

## 2022-05-23 PROCEDURE — 83036 HEMOGLOBIN GLYCOSYLATED A1C: CPT | Mod: QW

## 2022-05-23 PROCEDURE — 99214 OFFICE O/P EST MOD 30 MIN: CPT | Mod: 25

## 2022-05-23 NOTE — ASSESSMENT
[FreeTextEntry1] : Patient with improved control  type 2 diabetes. He is much improved; will remain on her current regimen of medication but lower Toujeo 40 to 36 u. .He'll remain off alcohol. He will be more compliant with insulin especially since using the Humalog  In pen with duglas.  Rarely uses Humalog but has been using for high CARB meals. . Using target 120 CF 50 and Carb  15:1. \par f/u 3 mos\par Will call for labs done at urologist today/.\par BP high has f/u with cardiologist; will check BP at home.  [Diabetes Foot Care] : diabetes foot care [Long Term Vascular Complications] : long term vascular complications of diabetes [Carbohydrate Consistent Diet] : carbohydrate consistent diet [Importance of Diet and Exercise] : importance of diet and exercise to improve glycemic control, achieve weight loss and improve cardiovascular health [Hypoglycemia Management] : hypoglycemia management [Self Monitoring of Blood Glucose] : self monitoring of blood glucose [Retinopathy Screening] : Patient was referred to ophthalmology for retinopathy screening [Diabetic Medications] : Risks and benefits of diabetic medications were discussed

## 2022-05-23 NOTE — HISTORY OF PRESENT ILLNESS
[FreeTextEntry1] : Type 2 diabetes and has been using DexCom sensor. He has not had any significant low sugars; sugars are averaging around 140 which is an improvement.   and his  prior A1c had increased to over 9.then  8.3 and now 7.0.  Since he started using the In Pen which he loves is more compliant with meal time insulin. ,\par He did have the Covid illness in march 2020.. He feels his diet is good but he does admit to being non- compliant with his insulin regimen but better since using the In Pen and duglas., Toujeo 40 and mealtime Humalog  before meals . he does take  Jardiance and metformin. \par He had the  vaccine  Covid.  [Continuous Glucose Monitoring] : Continuous Glucose Monitoring: Yes [Dexcom] : Dexcom [FreeTextEntry2] : 86 [FreeTextEntry3] : 14 [FreeTextEntry4] : 0 [FreeTextEntry5] : 140 [FreeTextEntry6] : 05

## 2022-07-07 ENCOUNTER — APPOINTMENT (OUTPATIENT)
Dept: CARDIOLOGY | Facility: CLINIC | Age: 62
End: 2022-07-07

## 2022-07-07 ENCOUNTER — NON-APPOINTMENT (OUTPATIENT)
Age: 62
End: 2022-07-07

## 2022-07-07 VITALS — SYSTOLIC BLOOD PRESSURE: 140 MMHG | DIASTOLIC BLOOD PRESSURE: 80 MMHG

## 2022-07-07 VITALS
DIASTOLIC BLOOD PRESSURE: 80 MMHG | HEART RATE: 84 BPM | BODY MASS INDEX: 25.71 KG/M2 | OXYGEN SATURATION: 98 % | HEIGHT: 66 IN | SYSTOLIC BLOOD PRESSURE: 120 MMHG | TEMPERATURE: 98.2 F | WEIGHT: 160 LBS

## 2022-07-07 PROCEDURE — 99213 OFFICE O/P EST LOW 20 MIN: CPT

## 2022-07-07 PROCEDURE — 93000 ELECTROCARDIOGRAM COMPLETE: CPT

## 2022-07-07 NOTE — HISTORY OF PRESENT ILLNESS
[FreeTextEntry1] : 62M with DM, HTN, HLD who presents for follow up of chronic conditions. 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

## 2022-07-07 NOTE — DISCUSSION/SUMMARY
[FreeTextEntry1] : 62M DM, HTN, HLD \par \par 1. HTN: Initially good, slightly up on repeat. Home readings 130s-140s. Continue home monitoring. Low threshold to escalate meds\par \par Cont Meds\par Low salt diet\par Enforced med compliance\par Home monitoring off BP \par \par 2. HLD: Taking meds daily, lipids good. TG slightly high\par \par 3. DM: A1C 7.0%, management as per endo.\par \par RV 6M\par Eventually repeat EST  [EKG obtained to assist in diagnosis and management of assessed problem(s)] : EKG obtained to assist in diagnosis and management of assessed problem(s)

## 2022-08-15 ENCOUNTER — RX RENEWAL (OUTPATIENT)
Age: 62
End: 2022-08-15

## 2022-08-22 ENCOUNTER — APPOINTMENT (OUTPATIENT)
Dept: ENDOCRINOLOGY | Facility: CLINIC | Age: 62
End: 2022-08-22

## 2022-08-22 VITALS
BODY MASS INDEX: 26.2 KG/M2 | SYSTOLIC BLOOD PRESSURE: 155 MMHG | OXYGEN SATURATION: 93 % | HEART RATE: 77 BPM | DIASTOLIC BLOOD PRESSURE: 92 MMHG | WEIGHT: 163 LBS | HEIGHT: 66 IN

## 2022-08-22 LAB
GLUCOSE BLDC GLUCOMTR-MCNC: 85
HBA1C MFR BLD HPLC: 7.2

## 2022-08-22 PROCEDURE — 83036 HEMOGLOBIN GLYCOSYLATED A1C: CPT | Mod: QW

## 2022-08-22 PROCEDURE — 99214 OFFICE O/P EST MOD 30 MIN: CPT | Mod: 25

## 2022-08-22 PROCEDURE — 95251 CONT GLUC MNTR ANALYSIS I&R: CPT

## 2022-08-22 PROCEDURE — 82962 GLUCOSE BLOOD TEST: CPT

## 2022-08-22 NOTE — HISTORY OF PRESENT ILLNESS
[FreeTextEntry1] : Type 2 diabetes and has been using DexCom sensor. He has not had any significant low sugars; sugars are averaging around 140 which is an improvement.   and his  prior A1c had increased to over 9.then  8.3 and now 7.2.  Since he started using the In Pen which he loves is more compliant with meal time insulin. ,\par He did have the Covid illness in march 2020.. He feels his diet is good but he does admit to being non- compliant with his insulin regimen but better since using the In Pen and duglas., Toujeo 40 and mealtime Humalog  before meals . he does take  Jardiance and metformin. \par He had the  vaccine  Covid.  [Continuous Glucose Monitoring] : Continuous Glucose Monitoring: Yes [Dexcom] : Dexcom [FreeTextEntry2] : 58 [FreeTextEntry3] : 41 [FreeTextEntry4] : 9 [de-identified] : 7.5 [FreeTextEntry5] : 173 [FreeTextEntry7] : 50

## 2022-08-22 NOTE — ASSESSMENT
[FreeTextEntry1] : Patient with improved control  type 2 diabetes. He is much improved; will remain on her current regimen of medication but lower Toujeo 40 to 36 u. .He'll remain off alcohol. He will be more compliant with insulin especially since using the Humalog  In pen with duglas.  Rarely uses Humalog but has been using for high CARB meals. . Using target 120 CF 50 and Carb  15:1. \par f/u 3 mos to do labs \par Will call for labs done at urologist today/.\par BP high has f/u with cardiologist; will check BP at home. \par  [Diabetes Foot Care] : diabetes foot care [Long Term Vascular Complications] : long term vascular complications of diabetes [Carbohydrate Consistent Diet] : carbohydrate consistent diet [Importance of Diet and Exercise] : importance of diet and exercise to improve glycemic control, achieve weight loss and improve cardiovascular health [Self Monitoring of Blood Glucose] : self monitoring of blood glucose [Retinopathy Screening] : Patient was referred to ophthalmology for retinopathy screening [Diabetic Medications] : Risks and benefits of diabetic medications were discussed

## 2022-10-16 ENCOUNTER — APPOINTMENT (OUTPATIENT)
Dept: FAMILY MEDICINE | Facility: CLINIC | Age: 62
End: 2022-10-16

## 2022-10-16 VITALS
WEIGHT: 160 LBS | HEART RATE: 69 BPM | OXYGEN SATURATION: 96 % | BODY MASS INDEX: 25.71 KG/M2 | HEIGHT: 66 IN | SYSTOLIC BLOOD PRESSURE: 121 MMHG | DIASTOLIC BLOOD PRESSURE: 71 MMHG

## 2022-10-16 DIAGNOSIS — Z23 ENCOUNTER FOR IMMUNIZATION: ICD-10-CM

## 2022-10-16 PROCEDURE — G0008: CPT

## 2022-10-16 PROCEDURE — 90686 IIV4 VACC NO PRSV 0.5 ML IM: CPT

## 2022-10-16 PROCEDURE — 36415 COLL VENOUS BLD VENIPUNCTURE: CPT

## 2022-10-16 PROCEDURE — 99396 PREV VISIT EST AGE 40-64: CPT | Mod: 25

## 2022-10-17 LAB
25(OH)D3 SERPL-MCNC: 17.2 NG/ML
ALBUMIN SERPL ELPH-MCNC: 4.9 G/DL
ALP BLD-CCNC: 74 U/L
ALT SERPL-CCNC: 33 U/L
ANION GAP SERPL CALC-SCNC: 18 MMOL/L
APPEARANCE: CLEAR
AST SERPL-CCNC: 20 U/L
BACTERIA: NEGATIVE
BASOPHILS # BLD AUTO: 0.03 K/UL
BASOPHILS NFR BLD AUTO: 0.5 %
BILIRUB SERPL-MCNC: 0.3 MG/DL
BILIRUBIN URINE: NEGATIVE
BLOOD URINE: ABNORMAL
BUN SERPL-MCNC: 23 MG/DL
CALCIUM SERPL-MCNC: 10.4 MG/DL
CHLORIDE SERPL-SCNC: 106 MMOL/L
CHOLEST SERPL-MCNC: 139 MG/DL
CO2 SERPL-SCNC: 18 MMOL/L
COLOR: NORMAL
CREAT SERPL-MCNC: 1.17 MG/DL
CREAT SPEC-SCNC: 69 MG/DL
EGFR: 71 ML/MIN/1.73M2
EOSINOPHIL # BLD AUTO: 0.13 K/UL
EOSINOPHIL NFR BLD AUTO: 2.1 %
ESTIMATED AVERAGE GLUCOSE: 151 MG/DL
GLUCOSE QUALITATIVE U: ABNORMAL
GLUCOSE SERPL-MCNC: 135 MG/DL
HBA1C MFR BLD HPLC: 6.9 %
HCT VFR BLD CALC: 48.2 %
HDLC SERPL-MCNC: 43 MG/DL
HGB BLD-MCNC: 15.8 G/DL
HYALINE CASTS: 1 /LPF
IMM GRANULOCYTES NFR BLD AUTO: 0.3 %
KETONES URINE: NEGATIVE
LDLC SERPL CALC-MCNC: 50 MG/DL
LEUKOCYTE ESTERASE URINE: NEGATIVE
LYMPHOCYTES # BLD AUTO: 1.77 K/UL
LYMPHOCYTES NFR BLD AUTO: 29.1 %
MAN DIFF?: NORMAL
MCHC RBC-ENTMCNC: 29.9 PG
MCHC RBC-ENTMCNC: 32.8 GM/DL
MCV RBC AUTO: 91.1 FL
MICROALBUMIN 24H UR DL<=1MG/L-MCNC: 1.4 MG/DL
MICROALBUMIN/CREAT 24H UR-RTO: 21 MG/G
MICROSCOPIC-UA: NORMAL
MONOCYTES # BLD AUTO: 0.44 K/UL
MONOCYTES NFR BLD AUTO: 7.2 %
NEUTROPHILS # BLD AUTO: 3.7 K/UL
NEUTROPHILS NFR BLD AUTO: 60.8 %
NITRITE URINE: NEGATIVE
NONHDLC SERPL-MCNC: 96 MG/DL
PH URINE: 6
PLATELET # BLD AUTO: 318 K/UL
POTASSIUM SERPL-SCNC: 4.7 MMOL/L
PROT SERPL-MCNC: 8.1 G/DL
PROTEIN URINE: NEGATIVE
PSA SERPL-MCNC: 0.76 NG/ML
RBC # BLD: 5.29 M/UL
RBC # FLD: 14.5 %
RED BLOOD CELLS URINE: 4 /HPF
SODIUM SERPL-SCNC: 143 MMOL/L
SPECIFIC GRAVITY URINE: 1.03
SQUAMOUS EPITHELIAL CELLS: 0 /HPF
TRIGL SERPL-MCNC: 232 MG/DL
TSH SERPL-ACNC: 1.72 UIU/ML
UROBILINOGEN URINE: NORMAL
WBC # FLD AUTO: 6.09 K/UL
WHITE BLOOD CELLS URINE: 1 /HPF

## 2022-10-18 LAB
T4 FREE SERPL-MCNC: 1.3 NG/DL
T4 SERPL-MCNC: 7.1 UG/DL

## 2022-11-26 ENCOUNTER — RX RENEWAL (OUTPATIENT)
Age: 62
End: 2022-11-26

## 2022-12-27 ENCOUNTER — APPOINTMENT (OUTPATIENT)
Dept: ENDOCRINOLOGY | Facility: CLINIC | Age: 62
End: 2022-12-27

## 2022-12-27 ENCOUNTER — RX RENEWAL (OUTPATIENT)
Age: 62
End: 2022-12-27

## 2022-12-27 VITALS
OXYGEN SATURATION: 96 % | WEIGHT: 165 LBS | HEIGHT: 66 IN | BODY MASS INDEX: 26.52 KG/M2 | SYSTOLIC BLOOD PRESSURE: 169 MMHG | HEART RATE: 69 BPM | DIASTOLIC BLOOD PRESSURE: 92 MMHG

## 2022-12-27 LAB
GLUCOSE BLDC GLUCOMTR-MCNC: 84
HBA1C MFR BLD HPLC: 6.7

## 2022-12-27 PROCEDURE — 99213 OFFICE O/P EST LOW 20 MIN: CPT | Mod: 25

## 2022-12-27 PROCEDURE — 82962 GLUCOSE BLOOD TEST: CPT

## 2022-12-27 PROCEDURE — 95251 CONT GLUC MNTR ANALYSIS I&R: CPT

## 2022-12-27 PROCEDURE — 83036 HEMOGLOBIN GLYCOSYLATED A1C: CPT | Mod: QW

## 2022-12-27 NOTE — ASSESSMENT
[Diabetes Foot Care] : diabetes foot care [Long Term Vascular Complications] : long term vascular complications of diabetes [Carbohydrate Consistent Diet] : carbohydrate consistent diet [Importance of Diet and Exercise] : importance of diet and exercise to improve glycemic control, achieve weight loss and improve cardiovascular health [Hypoglycemia Management] : hypoglycemia management [Self Monitoring of Blood Glucose] : self monitoring of blood glucose [Retinopathy Screening] : Patient was referred to ophthalmology for retinopathy screening [FreeTextEntry1] : Patient with improved control  type 2 diabetes. He is much improved; will remain on her current regimen of medication  Toujeo  36 u. .He'll remain off alcohol. He will be more compliant with insulin especially since using the Humalog  In pen with duglas.  Rarely uses Humalog but has been using for high CARB meals. . Using target 120 CF 50 and Carb  15:1. Does not seem to be bolusing with In pen although he says he is ? will f/u CDE shortly to review technique. \par f/u 3 mos \par BP high has f/u with cardiologist; will check BP at home. \par

## 2022-12-27 NOTE — HISTORY OF PRESENT ILLNESS
[FreeTextEntry1] : Type 2 diabetes and has been using DexCom sensor. He has not had any significant low sugars; sugars are averaging around 140 which is an improvement.   and his  prior A1c had increased to over 9.then  8.3 and now 6.7.   Since he started using the In Pen which he loves is more compliant with meal time insulin. ,\par He did have the Covid illness in march 2020.. He feels his diet is good but he does admit to being non- compliant with his insulin regimen but better since using the In Pen and duglas., Toujeo 36 and mealtime Humalog  before meals . he does take  Jardiance and metformin. \par He had the  vaccine  Covid.  [Continuous Glucose Monitoring] : Continuous Glucose Monitoring: Yes [Dexcom] : Dexcom [FreeTextEntry2] : 79 [FreeTextEntry3] : 17+1 [FreeTextEntry4] : 2 [de-identified] : 6.6 [FreeTextEntry5] : 139 [FreeTextEntry7] : 93

## 2022-12-30 ENCOUNTER — NON-APPOINTMENT (OUTPATIENT)
Age: 62
End: 2022-12-30

## 2023-01-06 ENCOUNTER — APPOINTMENT (OUTPATIENT)
Dept: CARDIOLOGY | Facility: CLINIC | Age: 63
End: 2023-01-06
Payer: COMMERCIAL

## 2023-01-06 VITALS
HEART RATE: 66 BPM | HEIGHT: 66 IN | SYSTOLIC BLOOD PRESSURE: 130 MMHG | WEIGHT: 165 LBS | BODY MASS INDEX: 26.52 KG/M2 | DIASTOLIC BLOOD PRESSURE: 86 MMHG | OXYGEN SATURATION: 98 %

## 2023-01-06 PROCEDURE — 99213 OFFICE O/P EST LOW 20 MIN: CPT

## 2023-01-06 NOTE — DISCUSSION/SUMMARY
[FreeTextEntry1] : 62M DM, HTN, HLD \par \par 1. HTN: BP good today. Cont meds. Crt stable\par \par Cont Meds\par Low salt diet\par Enforced med compliance\par Home monitoring of BP \par \par 2. HLD: Taking meds daily, lipids good. TG has been high\par \par 3. DM: A1C 6.7%, management as per endo.\par \par EST in 6M

## 2023-01-06 NOTE — HISTORY OF PRESENT ILLNESS
[FreeTextEntry1] : 62M with DM, HTN, HLD who presents for follow up of chronic conditions. \par \par He physically feels well\par Denies CP, dyspnea, lightheadedness\par Last a1c 6.7%\par \par Nonsmoker. Denies family hx of CAD.  Retired EMT\par \par ECG: SR, no ST-T wave changes, slow RWP \par TTE: 65-70%, normal \par EST: 9 mins, no ischemia, DTS 9\par Carotid 2019: Mild bilateral disease\par \par Cardiac meds: \par \par asa 81mg\par Lipitor 40mg\par \par Amlodipine 10mg\par Bystolic 5mg daily\par Losartan 100mg\par \par Jardiance\par Metformin\par insulin

## 2023-01-18 ENCOUNTER — APPOINTMENT (OUTPATIENT)
Dept: ENDOCRINOLOGY | Facility: CLINIC | Age: 63
End: 2023-01-18
Payer: COMMERCIAL

## 2023-01-18 VITALS
HEART RATE: 78 BPM | SYSTOLIC BLOOD PRESSURE: 155 MMHG | HEIGHT: 66 IN | BODY MASS INDEX: 26.68 KG/M2 | DIASTOLIC BLOOD PRESSURE: 93 MMHG | OXYGEN SATURATION: 94 % | WEIGHT: 166 LBS

## 2023-01-18 LAB — GLUCOSE BLDC GLUCOMTR-MCNC: 108

## 2023-01-18 PROCEDURE — 82962 GLUCOSE BLOOD TEST: CPT

## 2023-01-18 PROCEDURE — 95251 CONT GLUC MNTR ANALYSIS I&R: CPT

## 2023-01-18 PROCEDURE — G0108 DIAB MANAGE TRN  PER INDIV: CPT

## 2023-02-28 ENCOUNTER — RX RENEWAL (OUTPATIENT)
Age: 63
End: 2023-02-28

## 2023-03-12 ENCOUNTER — RX RENEWAL (OUTPATIENT)
Age: 63
End: 2023-03-12

## 2023-03-15 ENCOUNTER — RX RENEWAL (OUTPATIENT)
Age: 63
End: 2023-03-15

## 2023-03-27 ENCOUNTER — RX RENEWAL (OUTPATIENT)
Age: 63
End: 2023-03-27

## 2023-04-17 ENCOUNTER — APPOINTMENT (OUTPATIENT)
Dept: FAMILY MEDICINE | Facility: CLINIC | Age: 63
End: 2023-04-17
Payer: COMMERCIAL

## 2023-04-17 VITALS
HEIGHT: 66 IN | WEIGHT: 160 LBS | BODY MASS INDEX: 25.71 KG/M2 | DIASTOLIC BLOOD PRESSURE: 80 MMHG | SYSTOLIC BLOOD PRESSURE: 124 MMHG | HEART RATE: 67 BPM | OXYGEN SATURATION: 97 %

## 2023-04-17 PROCEDURE — 99214 OFFICE O/P EST MOD 30 MIN: CPT

## 2023-05-08 ENCOUNTER — APPOINTMENT (OUTPATIENT)
Dept: ENDOCRINOLOGY | Facility: CLINIC | Age: 63
End: 2023-05-08
Payer: COMMERCIAL

## 2023-05-08 VITALS
SYSTOLIC BLOOD PRESSURE: 188 MMHG | OXYGEN SATURATION: 94 % | HEART RATE: 80 BPM | BODY MASS INDEX: 25.94 KG/M2 | HEIGHT: 66 IN | WEIGHT: 161.38 LBS | DIASTOLIC BLOOD PRESSURE: 94 MMHG

## 2023-05-08 DIAGNOSIS — Z86.39 PERSONAL HISTORY OF OTHER ENDOCRINE, NUTRITIONAL AND METABOLIC DISEASE: ICD-10-CM

## 2023-05-08 LAB
GLUCOSE BLDC GLUCOMTR-MCNC: 85
HBA1C MFR BLD HPLC: 6.2

## 2023-05-08 PROCEDURE — 82962 GLUCOSE BLOOD TEST: CPT

## 2023-05-08 PROCEDURE — 83036 HEMOGLOBIN GLYCOSYLATED A1C: CPT | Mod: QW

## 2023-05-08 PROCEDURE — 95251 CONT GLUC MNTR ANALYSIS I&R: CPT

## 2023-05-08 PROCEDURE — 99214 OFFICE O/P EST MOD 30 MIN: CPT | Mod: 25

## 2023-05-08 PROCEDURE — 36415 COLL VENOUS BLD VENIPUNCTURE: CPT

## 2023-05-08 NOTE — HISTORY OF PRESENT ILLNESS
[FreeTextEntry1] : Type 2 diabetes and has been using DexCom sensor. He has not had any significant low sugars; sugars are averaging around 140 which is an improvement.   and his  prior A1c had increased to over 9.then  8.3 and now 6.2.   Since he started using the In Pen which he loves is more compliant with meal time insulin. ,Mostly takes a dinner. \par He did have the Covid illness in march 2020.. He feels his diet is good but he does admit to being non- compliant with his insulin regimen but better since using the In Pen and duglas., Toujeo 36 and mealtime Humalog  before meals . he does take  Jardiance and metformin. \par  [Continuous Glucose Monitoring] : Continuous Glucose Monitoring: Yes [Dexcom] : Dexcom [FreeTextEntry2] : 82 [FreeTextEntry3] : 13 [FreeTextEntry4] : 4 [de-identified] : 6.4 [FreeTextEntry5] : 129 [FreeTextEntry7] : 46

## 2023-05-08 NOTE — PHYSICAL EXAM
Continue flomax 0.4 mg daily  Increase oxybutynin to 10mg daily   Side effects of this type of medication include, but are not limited to, dry mouth, constipation, stomach upset, urinary retention, confusion/delirium, drowsiness, or allergic reaction.   Schedule: Transrectal Ultrasound with Prostate Needle biopsy  To be performed in the office  Will require pre-procedure antibiotic prophylaxis with Cipro    We discussed the risks and benefits of this procedure in detail. Patient understands that prostate biopsy is the only way to diagnose prostate cancer, and he would like to proceed. He understands the risks include, but are not limited to, bleeding, hematuria, clot retention, hematospermia, rectal bleeding, infection/sepsis, pain, and a 4% risk of hospitalization.    [Alert] : alert [No Acute Distress] : no acute distress [Normal Voice/Communication] : normal voice communication [Normal Sclera/Conjunctiva] : normal sclera/conjunctiva [EOMI] : extra ocular movement intact [PERRL] : pupils equal, round and reactive to light [Thyroid Not Enlarged] : the thyroid was not enlarged [No Thyroid Nodules] : no palpable thyroid nodules [No Respiratory Distress] : no respiratory distress [Clear to Auscultation] : lungs were clear to auscultation bilaterally [Normal PMI] : the apical impulse was normal [Normal S1, S2] : normal S1 and S2 [Normal Rate] : heart rate was normal [No Edema] : no peripheral edema [Normal Bowel Sounds] : normal bowel sounds [Not Tender] : non-tender [Soft] : abdomen soft [Normal Gait] : normal gait [No Joint Swelling] : no joint swelling seen [No Rash] : no rash [Oriented x3] : oriented to person, place, and time [Normal Insight/Judgement] : insight and judgment were intact

## 2023-05-08 NOTE — ASSESSMENT
[Diabetes Foot Care] : diabetes foot care [Long Term Vascular Complications] : long term vascular complications of diabetes [Carbohydrate Consistent Diet] : carbohydrate consistent diet [Importance of Diet and Exercise] : importance of diet and exercise to improve glycemic control, achieve weight loss and improve cardiovascular health [Hypoglycemia Management] : hypoglycemia management [Self Monitoring of Blood Glucose] : self monitoring of blood glucose [Retinopathy Screening] : Patient was referred to ophthalmology for retinopathy screening [Diabetic Medications] : Risks and benefits of diabetic medications were discussed [FreeTextEntry1] : Patient with improved control  type 2 diabetes. He is much improved; will remain on her current regimen of medication  but decrease Toujeo  to 32 U from 36 u since FBS close to 70. Can be low in middle of night. Make sure not to sleep on sensor.  . .He'll remain off alcohol. He will be more compliant with insulin especially since using the Humalog  In pen with duglas.  Rarely uses Humalog but has been using for high CARB meals. . Using target 120 CF 50 and Carb  15:1.\par f/u 3 mos \par BP high has f/u with cardiologist; will check BP at home.To  medication he ran out of today. \par labs done  \par

## 2023-05-09 LAB
ALBUMIN SERPL ELPH-MCNC: 4.7 G/DL
ALP BLD-CCNC: 73 U/L
ALT SERPL-CCNC: 16 U/L
ANION GAP SERPL CALC-SCNC: 13 MMOL/L
APPEARANCE: CLEAR
AST SERPL-CCNC: 17 U/L
BILIRUB SERPL-MCNC: 0.4 MG/DL
BILIRUBIN URINE: NEGATIVE
BLOOD URINE: NEGATIVE
BUN SERPL-MCNC: 12 MG/DL
CALCIUM SERPL-MCNC: 9.9 MG/DL
CHLORIDE SERPL-SCNC: 110 MMOL/L
CHOLEST SERPL-MCNC: 116 MG/DL
CO2 SERPL-SCNC: 21 MMOL/L
COLOR: YELLOW
CREAT SERPL-MCNC: 1 MG/DL
CREAT SPEC-SCNC: 74 MG/DL
EGFR: 86 ML/MIN/1.73M2
GLUCOSE QUALITATIVE U: >=1000 MG/DL
GLUCOSE SERPL-MCNC: 88 MG/DL
HDLC SERPL-MCNC: 52 MG/DL
KETONES URINE: NEGATIVE MG/DL
LDLC SERPL CALC-MCNC: 45 MG/DL
LEUKOCYTE ESTERASE URINE: NEGATIVE
MICROALBUMIN 24H UR DL<=1MG/L-MCNC: 1.8 MG/DL
MICROALBUMIN/CREAT 24H UR-RTO: 25 MG/G
NITRITE URINE: NEGATIVE
NONHDLC SERPL-MCNC: 65 MG/DL
PH URINE: 6
POTASSIUM SERPL-SCNC: 4 MMOL/L
PROT SERPL-MCNC: 8 G/DL
PROTEIN URINE: NEGATIVE MG/DL
SODIUM SERPL-SCNC: 144 MMOL/L
SPECIFIC GRAVITY URINE: 1.03
T4 FREE SERPL-MCNC: 1.2 NG/DL
T4 SERPL-MCNC: 6.5 UG/DL
TRIGL SERPL-MCNC: 98 MG/DL
TSH SERPL-ACNC: 1.4 UIU/ML
UROBILINOGEN URINE: 0.2 MG/DL

## 2023-05-20 ENCOUNTER — RX RENEWAL (OUTPATIENT)
Age: 63
End: 2023-05-20

## 2023-07-03 ENCOUNTER — APPOINTMENT (OUTPATIENT)
Dept: ENDOCRINOLOGY | Facility: CLINIC | Age: 63
End: 2023-07-03

## 2023-07-07 ENCOUNTER — APPOINTMENT (OUTPATIENT)
Dept: CARDIOLOGY | Facility: CLINIC | Age: 63
End: 2023-07-07

## 2023-07-17 ENCOUNTER — APPOINTMENT (OUTPATIENT)
Dept: FAMILY MEDICINE | Facility: CLINIC | Age: 63
End: 2023-07-17
Payer: COMMERCIAL

## 2023-07-17 VITALS
DIASTOLIC BLOOD PRESSURE: 84 MMHG | HEIGHT: 66 IN | OXYGEN SATURATION: 98 % | BODY MASS INDEX: 25.96 KG/M2 | HEART RATE: 72 BPM | WEIGHT: 161.5 LBS | SYSTOLIC BLOOD PRESSURE: 157 MMHG | TEMPERATURE: 97.8 F

## 2023-07-17 VITALS — DIASTOLIC BLOOD PRESSURE: 78 MMHG | SYSTOLIC BLOOD PRESSURE: 126 MMHG

## 2023-07-17 PROCEDURE — 99213 OFFICE O/P EST LOW 20 MIN: CPT

## 2023-08-07 ENCOUNTER — APPOINTMENT (OUTPATIENT)
Dept: ENDOCRINOLOGY | Facility: CLINIC | Age: 63
End: 2023-08-07
Payer: COMMERCIAL

## 2023-08-07 VITALS
WEIGHT: 159 LBS | OXYGEN SATURATION: 93 % | BODY MASS INDEX: 25.55 KG/M2 | DIASTOLIC BLOOD PRESSURE: 80 MMHG | HEART RATE: 74 BPM | HEIGHT: 66 IN | SYSTOLIC BLOOD PRESSURE: 149 MMHG

## 2023-08-07 LAB
GLUCOSE BLDC GLUCOMTR-MCNC: 164
HBA1C MFR BLD HPLC: 6

## 2023-08-07 PROCEDURE — 83036 HEMOGLOBIN GLYCOSYLATED A1C: CPT | Mod: QW

## 2023-08-07 PROCEDURE — 95251 CONT GLUC MNTR ANALYSIS I&R: CPT

## 2023-08-07 PROCEDURE — 99214 OFFICE O/P EST MOD 30 MIN: CPT | Mod: 25

## 2023-08-07 PROCEDURE — 82962 GLUCOSE BLOOD TEST: CPT

## 2023-08-07 NOTE — HISTORY OF PRESENT ILLNESS
[Continuous Glucose Monitoring] : Continuous Glucose Monitoring: Yes [Parveen] : Parveen [FreeTextEntry1] : Type 2 diabetes and has been using DexCom sensor. He has not had any significant low sugars; sugars are averaging around 140 which is an improvement.   and his  prior A1c had increased to over 9.then  8.3 and now 6.2.   Since he started using the In Pen which he loves is more compliant with meal time insulin. ,Mostly takes a dinner.  He did have the Covid illness in march 2020.. He feels his diet is good but he does admit to being non- compliant with his insulin regimen but better since using the In Pen and duglas., Toujeo 32 and mealtime Humalog before meals. he does take Jardiance and metformin.   [FreeTextEntry2] : 79 [FreeTextEntry3] : 11 [FreeTextEntry4] : 9 [FreeTextEntry5] : 122 [FreeTextEntry7] : 47

## 2023-08-07 NOTE — ASSESSMENT
[Diabetes Foot Care] : diabetes foot care [Long Term Vascular Complications] : long term vascular complications of diabetes [Carbohydrate Consistent Diet] : carbohydrate consistent diet [Importance of Diet and Exercise] : importance of diet and exercise to improve glycemic control, achieve weight loss and improve cardiovascular health [Hypoglycemia Management] : hypoglycemia management [Self Monitoring of Blood Glucose] : self monitoring of blood glucose [Retinopathy Screening] : Patient was referred to ophthalmology for retinopathy screening [Diabetic Medications] : Risks and benefits of diabetic medications were discussed [FreeTextEntry1] : Patient with improved control type 2 diabetes. He is much improved; will remain on her current regimen of medication  but decrease Toujeo  to 28 from 32 u since FBS close to 70. Can be low in middle of night. Make sure not to sleep on sensor.  . .He'll remain off alcohol. He will be more compliant with insulin especially since using the Humalog In pen with duglas.  Rarely uses Humalog but has been using for high CARB meals. . Using target 120 CF 50 and Carb  15:1. f/u 3 mos

## 2023-08-20 ENCOUNTER — RX RENEWAL (OUTPATIENT)
Age: 63
End: 2023-08-20

## 2023-09-19 ENCOUNTER — RX RENEWAL (OUTPATIENT)
Age: 63
End: 2023-09-19

## 2023-10-12 ENCOUNTER — APPOINTMENT (OUTPATIENT)
Dept: CARDIOLOGY | Facility: CLINIC | Age: 63
End: 2023-10-12
Payer: COMMERCIAL

## 2023-10-12 PROCEDURE — 99213 OFFICE O/P EST LOW 20 MIN: CPT | Mod: 25

## 2023-10-12 PROCEDURE — 93015 CV STRESS TEST SUPVJ I&R: CPT

## 2023-10-15 ENCOUNTER — APPOINTMENT (OUTPATIENT)
Dept: FAMILY MEDICINE | Facility: CLINIC | Age: 63
End: 2023-10-15

## 2023-10-20 RX ORDER — EMPAGLIFLOZIN 25 MG/1
25 TABLET, FILM COATED ORAL
Qty: 90 | Refills: 2 | Status: ACTIVE | COMMUNITY
Start: 2018-11-08 | End: 1900-01-01

## 2023-10-20 RX ORDER — INSULIN LISPRO 100 [IU]/ML
100 INJECTION, SOLUTION INTRAVENOUS; SUBCUTANEOUS
Qty: 1 | Refills: 3 | Status: ACTIVE | COMMUNITY
Start: 2021-05-27 | End: 1900-01-01

## 2023-11-20 ENCOUNTER — APPOINTMENT (OUTPATIENT)
Dept: ENDOCRINOLOGY | Facility: CLINIC | Age: 63
End: 2023-11-20

## 2023-11-21 ENCOUNTER — APPOINTMENT (OUTPATIENT)
Dept: FAMILY MEDICINE | Facility: CLINIC | Age: 63
End: 2023-11-21
Payer: COMMERCIAL

## 2023-11-21 VITALS
WEIGHT: 160 LBS | OXYGEN SATURATION: 98 % | HEIGHT: 66 IN | DIASTOLIC BLOOD PRESSURE: 84 MMHG | BODY MASS INDEX: 25.71 KG/M2 | RESPIRATION RATE: 14 BRPM | HEART RATE: 74 BPM | TEMPERATURE: 98.7 F | SYSTOLIC BLOOD PRESSURE: 150 MMHG

## 2023-11-21 DIAGNOSIS — Z00.00 ENCOUNTER FOR GENERAL ADULT MEDICAL EXAMINATION W/OUT ABNORMAL FINDINGS: ICD-10-CM

## 2023-11-21 PROCEDURE — 99396 PREV VISIT EST AGE 40-64: CPT | Mod: 25

## 2023-11-21 PROCEDURE — 36415 COLL VENOUS BLD VENIPUNCTURE: CPT

## 2023-11-22 LAB
25(OH)D3 SERPL-MCNC: 28.6 NG/ML
ALBUMIN SERPL ELPH-MCNC: 4.7 G/DL
ALP BLD-CCNC: 68 U/L
ALT SERPL-CCNC: 18 U/L
ANION GAP SERPL CALC-SCNC: 17 MMOL/L
APPEARANCE: CLEAR
AST SERPL-CCNC: 21 U/L
BACTERIA: NEGATIVE /HPF
BASOPHILS # BLD AUTO: 0.02 K/UL
BASOPHILS NFR BLD AUTO: 0.4 %
BILIRUB SERPL-MCNC: 0.6 MG/DL
BILIRUBIN URINE: NEGATIVE
BLOOD URINE: ABNORMAL
BUN SERPL-MCNC: 11 MG/DL
CALCIUM SERPL-MCNC: 10.3 MG/DL
CAST: 0 /LPF
CHLORIDE SERPL-SCNC: 102 MMOL/L
CHOLEST SERPL-MCNC: 132 MG/DL
CO2 SERPL-SCNC: 22 MMOL/L
COLOR: YELLOW
CREAT SERPL-MCNC: 1.04 MG/DL
EGFR: 82 ML/MIN/1.73M2
EOSINOPHIL # BLD AUTO: 0.12 K/UL
EOSINOPHIL NFR BLD AUTO: 2.3 %
EPITHELIAL CELLS: 0 /HPF
ESTIMATED AVERAGE GLUCOSE: 163 MG/DL
GLUCOSE QUALITATIVE U: >=1000 MG/DL
GLUCOSE SERPL-MCNC: 143 MG/DL
HBA1C MFR BLD HPLC: 7.3 %
HCT VFR BLD CALC: 43.6 %
HDLC SERPL-MCNC: 51 MG/DL
HGB BLD-MCNC: 14.4 G/DL
IMM GRANULOCYTES NFR BLD AUTO: 0.4 %
KETONES URINE: NEGATIVE MG/DL
LDLC SERPL CALC-MCNC: 57 MG/DL
LEUKOCYTE ESTERASE URINE: NEGATIVE
LYMPHOCYTES # BLD AUTO: 1.75 K/UL
LYMPHOCYTES NFR BLD AUTO: 32.8 %
MAN DIFF?: NORMAL
MCHC RBC-ENTMCNC: 30.4 PG
MCHC RBC-ENTMCNC: 33 GM/DL
MCV RBC AUTO: 92.2 FL
MICROSCOPIC-UA: NORMAL
MONOCYTES # BLD AUTO: 0.54 K/UL
MONOCYTES NFR BLD AUTO: 10.1 %
NEUTROPHILS # BLD AUTO: 2.88 K/UL
NEUTROPHILS NFR BLD AUTO: 54 %
NITRITE URINE: NEGATIVE
NONHDLC SERPL-MCNC: 81 MG/DL
PH URINE: 6.5
PLATELET # BLD AUTO: 312 K/UL
POTASSIUM SERPL-SCNC: 4.1 MMOL/L
PROT SERPL-MCNC: 7.8 G/DL
PROTEIN URINE: NEGATIVE MG/DL
PSA SERPL-MCNC: 1.19 NG/ML
RBC # BLD: 4.73 M/UL
RBC # FLD: 13.6 %
RED BLOOD CELLS URINE: 1 /HPF
SODIUM SERPL-SCNC: 141 MMOL/L
SPECIFIC GRAVITY URINE: 1.02
TRIGL SERPL-MCNC: 138 MG/DL
TSH SERPL-ACNC: 1.19 UIU/ML
UROBILINOGEN URINE: 0.2 MG/DL
WBC # FLD AUTO: 5.33 K/UL
WHITE BLOOD CELLS URINE: 0 /HPF

## 2023-12-07 ENCOUNTER — APPOINTMENT (OUTPATIENT)
Dept: ENDOCRINOLOGY | Facility: CLINIC | Age: 63
End: 2023-12-07
Payer: COMMERCIAL

## 2023-12-07 VITALS
OXYGEN SATURATION: 98 % | HEIGHT: 66 IN | DIASTOLIC BLOOD PRESSURE: 85 MMHG | BODY MASS INDEX: 25.88 KG/M2 | SYSTOLIC BLOOD PRESSURE: 148 MMHG | WEIGHT: 161 LBS | HEART RATE: 76 BPM

## 2023-12-07 LAB — GLUCOSE BLDC GLUCOMTR-MCNC: 86

## 2023-12-07 PROCEDURE — 82962 GLUCOSE BLOOD TEST: CPT

## 2023-12-07 PROCEDURE — 95251 CONT GLUC MNTR ANALYSIS I&R: CPT

## 2023-12-07 PROCEDURE — 99214 OFFICE O/P EST MOD 30 MIN: CPT | Mod: 25

## 2024-01-15 RX ORDER — METFORMIN HYDROCHLORIDE 1000 MG/1
1000 TABLET, COATED ORAL
Qty: 180 | Refills: 1 | Status: ACTIVE | COMMUNITY
Start: 2017-09-01 | End: 1900-01-01

## 2024-02-06 ENCOUNTER — APPOINTMENT (OUTPATIENT)
Dept: FAMILY MEDICINE | Facility: CLINIC | Age: 64
End: 2024-02-06
Payer: COMMERCIAL

## 2024-02-06 VITALS
TEMPERATURE: 98.8 F | DIASTOLIC BLOOD PRESSURE: 80 MMHG | WEIGHT: 155 LBS | HEART RATE: 72 BPM | HEIGHT: 66 IN | BODY MASS INDEX: 24.91 KG/M2 | SYSTOLIC BLOOD PRESSURE: 140 MMHG | RESPIRATION RATE: 16 BRPM | OXYGEN SATURATION: 98 %

## 2024-02-06 PROCEDURE — 99214 OFFICE O/P EST MOD 30 MIN: CPT

## 2024-02-06 RX ORDER — SODIUM PICOSULFATE, MAGNESIUM OXIDE, AND ANHYDROUS CITRIC ACID 10; 3.5; 12 MG/160ML; G/160ML; G/160ML
10-3.5-12 MG-GM LIQUID ORAL
Qty: 1 | Refills: 0 | Status: DISCONTINUED | COMMUNITY
Start: 2019-09-09 | End: 2024-02-06

## 2024-02-07 LAB
ALBUMIN SERPL ELPH-MCNC: 4.6 G/DL
ALP BLD-CCNC: 82 U/L
ALT SERPL-CCNC: 12 U/L
ANION GAP SERPL CALC-SCNC: 14 MMOL/L
AST SERPL-CCNC: 13 U/L
BASOPHILS # BLD AUTO: 0.02 K/UL
BASOPHILS NFR BLD AUTO: 0.3 %
BILIRUB SERPL-MCNC: 0.4 MG/DL
BUN SERPL-MCNC: 13 MG/DL
CALCIUM SERPL-MCNC: 10 MG/DL
CHLORIDE SERPL-SCNC: 103 MMOL/L
CHOLEST SERPL-MCNC: 122 MG/DL
CO2 SERPL-SCNC: 24 MMOL/L
CREAT SERPL-MCNC: 1 MG/DL
EGFR: 85 ML/MIN/1.73M2
EOSINOPHIL # BLD AUTO: 0.03 K/UL
EOSINOPHIL NFR BLD AUTO: 0.5 %
ESTIMATED AVERAGE GLUCOSE: 140 MG/DL
GLUCOSE SERPL-MCNC: 137 MG/DL
HBA1C MFR BLD HPLC: 6.5 %
HCT VFR BLD CALC: 45 %
HDLC SERPL-MCNC: 43 MG/DL
HGB BLD-MCNC: 14.9 G/DL
IMM GRANULOCYTES NFR BLD AUTO: 0.2 %
LDLC SERPL CALC-MCNC: 57 MG/DL
LYMPHOCYTES # BLD AUTO: 1.78 K/UL
LYMPHOCYTES NFR BLD AUTO: 26.9 %
MAN DIFF?: NORMAL
MCHC RBC-ENTMCNC: 30.5 PG
MCHC RBC-ENTMCNC: 33.1 GM/DL
MCV RBC AUTO: 92.2 FL
MONOCYTES # BLD AUTO: 0.51 K/UL
MONOCYTES NFR BLD AUTO: 7.7 %
NEUTROPHILS # BLD AUTO: 4.26 K/UL
NEUTROPHILS NFR BLD AUTO: 64.4 %
NONHDLC SERPL-MCNC: 79 MG/DL
PLATELET # BLD AUTO: 323 K/UL
POTASSIUM SERPL-SCNC: 4.2 MMOL/L
PROT SERPL-MCNC: 7.8 G/DL
RBC # BLD: 4.88 M/UL
RBC # FLD: 14.1 %
SODIUM SERPL-SCNC: 141 MMOL/L
TRIGL SERPL-MCNC: 127 MG/DL
TSH SERPL-ACNC: 1.01 UIU/ML
WBC # FLD AUTO: 6.61 K/UL

## 2024-03-07 ENCOUNTER — APPOINTMENT (OUTPATIENT)
Dept: ENDOCRINOLOGY | Facility: CLINIC | Age: 64
End: 2024-03-07
Payer: COMMERCIAL

## 2024-03-07 VITALS
SYSTOLIC BLOOD PRESSURE: 148 MMHG | HEIGHT: 66 IN | DIASTOLIC BLOOD PRESSURE: 90 MMHG | OXYGEN SATURATION: 96 % | HEART RATE: 68 BPM | WEIGHT: 156 LBS | BODY MASS INDEX: 25.07 KG/M2

## 2024-03-07 LAB — GLUCOSE BLDC GLUCOMTR-MCNC: 126

## 2024-03-07 PROCEDURE — 95251 CONT GLUC MNTR ANALYSIS I&R: CPT

## 2024-03-07 PROCEDURE — 99214 OFFICE O/P EST MOD 30 MIN: CPT | Mod: 25

## 2024-03-07 PROCEDURE — 82962 GLUCOSE BLOOD TEST: CPT

## 2024-03-07 NOTE — ASSESSMENT
[Diabetes Foot Care] : diabetes foot care [Long Term Vascular Complications] : long term vascular complications of diabetes [Importance of Diet and Exercise] : importance of diet and exercise to improve glycemic control, achieve weight loss and improve cardiovascular health [Carbohydrate Consistent Diet] : carbohydrate consistent diet [Hypoglycemia Management] : hypoglycemia management [Retinopathy Screening] : Patient was referred to ophthalmology for retinopathy screening [Self Monitoring of Blood Glucose] : self monitoring of blood glucose [Diabetic Medications] : Risks and benefits of diabetic medications were discussed [FreeTextEntry1] : Patient with improved control type 2 diabetes. He is much improved; will remain on her current regimen of medication but decrease Toujeo to 24 from 26 u since FBS close to 70. Can be low in middle of night. Has found the In pen helpful. Make sure not to sleep on sensor...He'll remain off alcohol. He will be more compliant with insulin especially since using the Humalog In pen with duglas. Rarely uses Humalog but has been using for high CARB meals.. Using target 120 CF 50 and Carb 15:1. f/u 3 mos. Plan cystoscopy using the Dexcom G7 recently received               rolling walker

## 2024-03-07 NOTE — PHYSICAL EXAM
[Alert] : alert [No Acute Distress] : no acute distress [Normal Voice/Communication] : normal voice communication [Normal Sclera/Conjunctiva] : normal sclera/conjunctiva [EOMI] : extra ocular movement intact [PERRL] : pupils equal, round and reactive to light [Thyroid Not Enlarged] : the thyroid was not enlarged [No Thyroid Nodules] : no palpable thyroid nodules [No Respiratory Distress] : no respiratory distress [Clear to Auscultation] : lungs were clear to auscultation bilaterally [Normal PMI] : the apical impulse was normal [Normal Rate] : heart rate was normal [Normal S1, S2] : normal S1 and S2 [No Edema] : no peripheral edema [Normal Bowel Sounds] : normal bowel sounds [Not Tender] : non-tender [Soft] : abdomen soft [Normal Gait] : normal gait [No Joint Swelling] : no joint swelling seen [No Rash] : no rash [Normal Insight/Judgement] : insight and judgment were intact [Oriented x3] : oriented to person, place, and time

## 2024-03-07 NOTE — HISTORY OF PRESENT ILLNESS
[Continuous Glucose Monitoring] : Continuous Glucose Monitoring: Yes [Parveen] : Parveen [FreeTextEntry2] : 83 [FreeTextEntry3] : 13 [FreeTextEntry4] : 3 [de-identified] : 6.5 [FreeTextEntry6] : 32.3 [FreeTextEntry5] : 133 [FreeTextEntry1] : Patient in excellent control, tends to be low in am so will decrease. Toujeo to 26 or 24 u. GMI 6.4 A1C was 7.1.  [FreeTextEntry7] : 43

## 2024-03-08 ENCOUNTER — APPOINTMENT (OUTPATIENT)
Dept: GASTROENTEROLOGY | Facility: CLINIC | Age: 64
End: 2024-03-08

## 2024-03-14 ENCOUNTER — NON-APPOINTMENT (OUTPATIENT)
Age: 64
End: 2024-03-14

## 2024-03-15 RX ORDER — INSULIN PEN,REUSABLE,BT LISPRO
INSULIN PEN (EA) SUBCUTANEOUS
Qty: 1 | Refills: 1 | Status: ACTIVE | COMMUNITY
Start: 2021-05-27

## 2024-03-18 ENCOUNTER — NON-APPOINTMENT (OUTPATIENT)
Age: 64
End: 2024-03-18

## 2024-03-22 ENCOUNTER — RX RENEWAL (OUTPATIENT)
Age: 64
End: 2024-03-22

## 2024-03-28 ENCOUNTER — APPOINTMENT (OUTPATIENT)
Dept: INTERNAL MEDICINE | Facility: CLINIC | Age: 64
End: 2024-03-28

## 2024-04-12 ENCOUNTER — RX RENEWAL (OUTPATIENT)
Age: 64
End: 2024-04-12

## 2024-05-19 ENCOUNTER — APPOINTMENT (OUTPATIENT)
Dept: FAMILY MEDICINE | Facility: CLINIC | Age: 64
End: 2024-05-19
Payer: COMMERCIAL

## 2024-05-19 VITALS
SYSTOLIC BLOOD PRESSURE: 144 MMHG | DIASTOLIC BLOOD PRESSURE: 81 MMHG | WEIGHT: 151 LBS | HEIGHT: 66 IN | TEMPERATURE: 98.7 F | HEART RATE: 69 BPM | BODY MASS INDEX: 24.27 KG/M2 | RESPIRATION RATE: 16 BRPM | OXYGEN SATURATION: 97 %

## 2024-05-19 PROCEDURE — 36415 COLL VENOUS BLD VENIPUNCTURE: CPT

## 2024-05-19 PROCEDURE — 99214 OFFICE O/P EST MOD 30 MIN: CPT | Mod: 25

## 2024-05-20 LAB
ALBUMIN SERPL ELPH-MCNC: 4.2 G/DL
ALP BLD-CCNC: 88 U/L
ALT SERPL-CCNC: 10 U/L
AMYLASE/CREAT SERPL: 167 U/L
ANION GAP SERPL CALC-SCNC: 15 MMOL/L
AST SERPL-CCNC: 12 U/L
BASOPHILS # BLD AUTO: 0.02 K/UL
BASOPHILS NFR BLD AUTO: 0.3 %
BILIRUB SERPL-MCNC: 0.3 MG/DL
BUN SERPL-MCNC: 11 MG/DL
CALCIUM SERPL-MCNC: 9.5 MG/DL
CHLORIDE SERPL-SCNC: 105 MMOL/L
CHOLEST SERPL-MCNC: 125 MG/DL
CO2 SERPL-SCNC: 19 MMOL/L
CREAT SERPL-MCNC: 0.95 MG/DL
EGFR: 90 ML/MIN/1.73M2
EOSINOPHIL # BLD AUTO: 0.1 K/UL
EOSINOPHIL NFR BLD AUTO: 1.5 %
ESTIMATED AVERAGE GLUCOSE: 134 MG/DL
GLUCOSE SERPL-MCNC: 178 MG/DL
HBA1C MFR BLD HPLC: 6.3 %
HCT VFR BLD CALC: 42.9 %
HDLC SERPL-MCNC: 38 MG/DL
HGB BLD-MCNC: 13.7 G/DL
IMM GRANULOCYTES NFR BLD AUTO: 0.2 %
LDLC SERPL CALC-MCNC: 56 MG/DL
LYMPHOCYTES # BLD AUTO: 1.85 K/UL
LYMPHOCYTES NFR BLD AUTO: 27.9 %
MAN DIFF?: NORMAL
MCHC RBC-ENTMCNC: 29.3 PG
MCHC RBC-ENTMCNC: 31.9 GM/DL
MCV RBC AUTO: 91.7 FL
MONOCYTES # BLD AUTO: 0.47 K/UL
MONOCYTES NFR BLD AUTO: 7.1 %
NEUTROPHILS # BLD AUTO: 4.17 K/UL
NEUTROPHILS NFR BLD AUTO: 63 %
NONHDLC SERPL-MCNC: 87 MG/DL
PLATELET # BLD AUTO: 392 K/UL
POTASSIUM SERPL-SCNC: 4.6 MMOL/L
PROT SERPL-MCNC: 7.2 G/DL
RBC # BLD: 4.68 M/UL
RBC # FLD: 15.1 %
SODIUM SERPL-SCNC: 138 MMOL/L
TRIGL SERPL-MCNC: 190 MG/DL
TSH SERPL-ACNC: 1.53 UIU/ML
WBC # FLD AUTO: 6.62 K/UL

## 2024-06-03 ENCOUNTER — RX RENEWAL (OUTPATIENT)
Age: 64
End: 2024-06-03

## 2024-06-03 RX ORDER — AMLODIPINE BESYLATE 10 MG/1
10 TABLET ORAL
Qty: 90 | Refills: 0 | Status: ACTIVE | COMMUNITY
Start: 2019-08-25 | End: 1900-01-01

## 2024-06-06 RX ORDER — INSULIN DEGLUDEC INJECTION 100 U/ML
100 INJECTION, SOLUTION SUBCUTANEOUS
Qty: 3 | Refills: 3 | Status: ACTIVE | COMMUNITY
Start: 2024-01-26 | End: 1900-01-01

## 2024-06-20 ENCOUNTER — APPOINTMENT (OUTPATIENT)
Dept: ENDOCRINOLOGY | Facility: CLINIC | Age: 64
End: 2024-06-20
Payer: COMMERCIAL

## 2024-06-20 VITALS
OXYGEN SATURATION: 99 % | WEIGHT: 155 LBS | HEIGHT: 66 IN | SYSTOLIC BLOOD PRESSURE: 121 MMHG | HEART RATE: 85 BPM | DIASTOLIC BLOOD PRESSURE: 72 MMHG | BODY MASS INDEX: 24.91 KG/M2

## 2024-06-20 DIAGNOSIS — R74.8 ABNORMAL LEVELS OF OTHER SERUM ENZYMES: ICD-10-CM

## 2024-06-20 DIAGNOSIS — E11.9 TYPE 2 DIABETES MELLITUS W/OUT COMPLICATIONS: ICD-10-CM

## 2024-06-20 DIAGNOSIS — E78.5 HYPERLIPIDEMIA, UNSPECIFIED: ICD-10-CM

## 2024-06-20 DIAGNOSIS — I10 ESSENTIAL (PRIMARY) HYPERTENSION: ICD-10-CM

## 2024-06-20 LAB — GLUCOSE BLDC GLUCOMTR-MCNC: 148

## 2024-06-20 PROCEDURE — 95251 CONT GLUC MNTR ANALYSIS I&R: CPT

## 2024-06-20 PROCEDURE — 82962 GLUCOSE BLOOD TEST: CPT

## 2024-06-20 PROCEDURE — 99214 OFFICE O/P EST MOD 30 MIN: CPT | Mod: 25

## 2024-06-20 NOTE — HISTORY OF PRESENT ILLNESS
[FreeTextEntry1] : Type 2 diabetes and has been using DexCom sensor. He has not had any significant low sugars; sugars are averaging around 140 which is an improvement.   and his  prior A1c had increased to over 9.then  8.3 and now 6.2.   Since he started using the In Pen which he loves is more compliant with meal time insulin. ,Mostly takes a dinner.  He did have the Covid illness in march 2020.. He feels his diet is good but he does admit to being non- compliant with his insulin regimen but better since using the In Pen and duglas., Toujeo 24 and mealtime Humalog before meals. he does take Jardiance and metformin.   [Continuous Glucose Monitoring] : Continuous Glucose Monitoring: Yes [Dexcom] : Dexcom [FreeTextEntry2] : 80 [FreeTextEntry3] : 13+2 [FreeTextEntry4] : 3+2 [FreeTextEntry5] : 129 [FreeTextEntry7] : 50

## 2024-06-20 NOTE — ASSESSMENT
[Diabetes Foot Care] : diabetes foot care [Long Term Vascular Complications] : long term vascular complications of diabetes [Carbohydrate Consistent Diet] : carbohydrate consistent diet [Importance of Diet and Exercise] : importance of diet and exercise to improve glycemic control, achieve weight loss and improve cardiovascular health [Hypoglycemia Management] : hypoglycemia management [Self Monitoring of Blood Glucose] : self monitoring of blood glucose [Retinopathy Screening] : Patient was referred to ophthalmology for retinopathy screening [Diabetic Medications] : Risks and benefits of diabetic medications were discussed [FreeTextEntry1] : Patient with improved control type 2 diabetes. He is much improved; will remain on her current regimen of medication but decrease Toujeo to 20 from 24 u since FBS close to 70. Can be low in middle of night. Has found the In pen helpful. Make sure not to sleep on sensor...He'll remain off alcohol. He will be more compliant with insulin especially since using the Humalog In pen with duglas. Rarely uses Humalog but has been using for high CARB meals.. Using target 120 CF 50 and Carb 15:1. f/u 3 mos. Has Dexcom G 7 now

## 2024-06-27 ENCOUNTER — RX RENEWAL (OUTPATIENT)
Age: 64
End: 2024-06-27

## 2024-07-17 ENCOUNTER — RX RENEWAL (OUTPATIENT)
Age: 64
End: 2024-07-17

## 2024-08-20 ENCOUNTER — RX RENEWAL (OUTPATIENT)
Age: 64
End: 2024-08-20

## 2024-09-23 ENCOUNTER — APPOINTMENT (OUTPATIENT)
Dept: ENDOCRINOLOGY | Facility: CLINIC | Age: 64
End: 2024-09-23
Payer: COMMERCIAL

## 2024-09-23 VITALS
BODY MASS INDEX: 24.11 KG/M2 | OXYGEN SATURATION: 97 % | HEART RATE: 80 BPM | HEIGHT: 66 IN | WEIGHT: 150 LBS | DIASTOLIC BLOOD PRESSURE: 82 MMHG | SYSTOLIC BLOOD PRESSURE: 144 MMHG

## 2024-09-23 DIAGNOSIS — E11.9 TYPE 2 DIABETES MELLITUS W/OUT COMPLICATIONS: ICD-10-CM

## 2024-09-23 DIAGNOSIS — I10 ESSENTIAL (PRIMARY) HYPERTENSION: ICD-10-CM

## 2024-09-23 LAB
GLUCOSE BLDC GLUCOMTR-MCNC: 128
HBA1C MFR BLD HPLC: 6.9

## 2024-09-23 PROCEDURE — 83036 HEMOGLOBIN GLYCOSYLATED A1C: CPT | Mod: QW

## 2024-09-23 PROCEDURE — 82962 GLUCOSE BLOOD TEST: CPT

## 2024-09-23 PROCEDURE — 99214 OFFICE O/P EST MOD 30 MIN: CPT | Mod: 25

## 2024-09-23 PROCEDURE — 95251 CONT GLUC MNTR ANALYSIS I&R: CPT

## 2024-09-23 NOTE — ASSESSMENT
[Diabetes Foot Care] : diabetes foot care [Long Term Vascular Complications] : long term vascular complications of diabetes [Carbohydrate Consistent Diet] : carbohydrate consistent diet [Importance of Diet and Exercise] : importance of diet and exercise to improve glycemic control, achieve weight loss and improve cardiovascular health [Hypoglycemia Management] : hypoglycemia management [Self Monitoring of Blood Glucose] : self monitoring of blood glucose [Retinopathy Screening] : Patient was referred to ophthalmology for retinopathy screening [Diabetic Medications] : Risks and benefits of diabetic medications were discussed [FreeTextEntry1] : Patient with improved control type 2 diabetes. He is much improved; will remain on her current regimen of medication but decrease Toujeo to 18.  Can be low in middle of night. Has found the In pen helpful. Make sure not to sleep on sensor...He'll remain off alcohol. He will be more compliant with insulin especially since using the Humalog In pen with duglas. Rarely uses Humalog but has been using for high CARB meals.. Using target 120 CF 50 and Carb 15:1.  Can look at his sensor at bedtime arrows are heading down he could decrease Tresiba by 2 units more. f/u 3 mos. Has Dexcom G 7 now

## 2024-09-23 NOTE — HISTORY OF PRESENT ILLNESS
[FreeTextEntry1] : Type 2 diabetes and has been using DexCom sensor. He has not had any significant low sugars; sugars are averaging around 140 which is an improvement.   and his  prior A1c had increased to over 9.then  8.3 and now 6.9.   Since he started using the In Pen which he loves is more compliant with meal time insulin. ,Mostly takes a dinner.  He did have the Covid illness in march 2020.. He feels his diet is good but he does admit to being non- compliant with his insulin regimen but better since using the In Pen and duglas., Toujeo 20 and mealtime Humalog before meals. he does take Jardiance and metformin.   [Continuous Glucose Monitoring] : Continuous Glucose Monitoring: Yes [Dexcom] : Dexcom [FreeTextEntry2] : 74 [FreeTextEntry3] : 23+3 [FreeTextEntry4] : 0 [de-identified] : 7.0 [FreeTextEntry5] : 153 [FreeTextEntry6] : 29.4 [FreeTextEntry7] : 45

## 2024-10-07 ENCOUNTER — RX RENEWAL (OUTPATIENT)
Age: 64
End: 2024-10-07

## 2024-10-10 ENCOUNTER — APPOINTMENT (OUTPATIENT)
Dept: CARDIOLOGY | Facility: CLINIC | Age: 64
End: 2024-10-10
Payer: COMMERCIAL

## 2024-10-10 ENCOUNTER — NON-APPOINTMENT (OUTPATIENT)
Age: 64
End: 2024-10-10

## 2024-10-10 VITALS
DIASTOLIC BLOOD PRESSURE: 80 MMHG | WEIGHT: 152.13 LBS | HEIGHT: 66 IN | OXYGEN SATURATION: 98 % | BODY MASS INDEX: 24.45 KG/M2 | HEART RATE: 68 BPM | SYSTOLIC BLOOD PRESSURE: 110 MMHG

## 2024-10-10 DIAGNOSIS — E78.5 HYPERLIPIDEMIA, UNSPECIFIED: ICD-10-CM

## 2024-10-10 DIAGNOSIS — I10 ESSENTIAL (PRIMARY) HYPERTENSION: ICD-10-CM

## 2024-10-10 PROCEDURE — 93000 ELECTROCARDIOGRAM COMPLETE: CPT

## 2024-10-10 PROCEDURE — G2211 COMPLEX E/M VISIT ADD ON: CPT | Mod: NC

## 2024-10-10 PROCEDURE — 99214 OFFICE O/P EST MOD 30 MIN: CPT

## 2024-10-11 ENCOUNTER — RX RENEWAL (OUTPATIENT)
Age: 64
End: 2024-10-11

## 2024-11-29 ENCOUNTER — RX RENEWAL (OUTPATIENT)
Age: 64
End: 2024-11-29

## 2024-12-30 ENCOUNTER — APPOINTMENT (OUTPATIENT)
Dept: ENDOCRINOLOGY | Facility: CLINIC | Age: 64
End: 2024-12-30
Payer: COMMERCIAL

## 2024-12-30 VITALS
DIASTOLIC BLOOD PRESSURE: 90 MMHG | BODY MASS INDEX: 24.75 KG/M2 | OXYGEN SATURATION: 99 % | HEIGHT: 66 IN | SYSTOLIC BLOOD PRESSURE: 150 MMHG | WEIGHT: 154 LBS | HEART RATE: 77 BPM

## 2024-12-30 DIAGNOSIS — E78.5 HYPERLIPIDEMIA, UNSPECIFIED: ICD-10-CM

## 2024-12-30 DIAGNOSIS — E11.9 TYPE 2 DIABETES MELLITUS W/OUT COMPLICATIONS: ICD-10-CM

## 2024-12-30 DIAGNOSIS — I10 ESSENTIAL (PRIMARY) HYPERTENSION: ICD-10-CM

## 2024-12-30 LAB — GLUCOSE BLDC GLUCOMTR-MCNC: 111

## 2024-12-30 PROCEDURE — 95251 CONT GLUC MNTR ANALYSIS I&R: CPT

## 2024-12-30 PROCEDURE — 99214 OFFICE O/P EST MOD 30 MIN: CPT

## 2024-12-30 PROCEDURE — 82962 GLUCOSE BLOOD TEST: CPT

## 2025-01-16 ENCOUNTER — RX RENEWAL (OUTPATIENT)
Age: 65
End: 2025-01-16

## 2025-01-17 ENCOUNTER — RX RENEWAL (OUTPATIENT)
Age: 65
End: 2025-01-17

## 2025-02-01 ENCOUNTER — RX RENEWAL (OUTPATIENT)
Age: 65
End: 2025-02-01

## 2025-03-14 ENCOUNTER — RX RENEWAL (OUTPATIENT)
Age: 65
End: 2025-03-14

## 2025-03-24 ENCOUNTER — APPOINTMENT (OUTPATIENT)
Dept: ENDOCRINOLOGY | Facility: CLINIC | Age: 65
End: 2025-03-24
Payer: COMMERCIAL

## 2025-03-24 VITALS
HEART RATE: 80 BPM | WEIGHT: 159 LBS | BODY MASS INDEX: 25.55 KG/M2 | DIASTOLIC BLOOD PRESSURE: 83 MMHG | SYSTOLIC BLOOD PRESSURE: 145 MMHG | OXYGEN SATURATION: 99 % | HEIGHT: 66 IN

## 2025-03-24 DIAGNOSIS — E11.9 TYPE 2 DIABETES MELLITUS W/OUT COMPLICATIONS: ICD-10-CM

## 2025-03-24 DIAGNOSIS — I10 ESSENTIAL (PRIMARY) HYPERTENSION: ICD-10-CM

## 2025-03-24 DIAGNOSIS — E78.5 HYPERLIPIDEMIA, UNSPECIFIED: ICD-10-CM

## 2025-03-24 LAB
GLUCOSE BLDC GLUCOMTR-MCNC: 193
HBA1C MFR BLD HPLC: 7.2

## 2025-03-24 PROCEDURE — 99214 OFFICE O/P EST MOD 30 MIN: CPT

## 2025-03-24 PROCEDURE — 36415 COLL VENOUS BLD VENIPUNCTURE: CPT

## 2025-03-24 PROCEDURE — 83036 HEMOGLOBIN GLYCOSYLATED A1C: CPT | Mod: QW

## 2025-03-24 PROCEDURE — 95251 CONT GLUC MNTR ANALYSIS I&R: CPT

## 2025-03-24 PROCEDURE — 82962 GLUCOSE BLOOD TEST: CPT

## 2025-03-25 LAB
APPEARANCE: CLEAR
BILIRUBIN URINE: NEGATIVE
BLOOD URINE: NEGATIVE
COLOR: NORMAL
CREAT SPEC-SCNC: 57 MG/DL
GLUCOSE QUALITATIVE U: >=1000 MG/DL
KETONES URINE: NEGATIVE MG/DL
LEUKOCYTE ESTERASE URINE: NEGATIVE
MICROALBUMIN 24H UR DL<=1MG/L-MCNC: <1.2 MG/DL
MICROALBUMIN/CREAT 24H UR-RTO: NORMAL MG/G
NITRITE URINE: NEGATIVE
PH URINE: 7
PROTEIN URINE: NEGATIVE MG/DL
SPECIFIC GRAVITY URINE: >1.03
UROBILINOGEN URINE: 0.2 MG/DL

## 2025-04-01 LAB
ALBUMIN SERPL ELPH-MCNC: 4.5 G/DL
ALP BLD-CCNC: 69 U/L
ALT SERPL-CCNC: 35 U/L
ANION GAP SERPL CALC-SCNC: 20 MMOL/L
AST SERPL-CCNC: 28 U/L
BILIRUB SERPL-MCNC: 0.3 MG/DL
BUN SERPL-MCNC: 11 MG/DL
CALCIUM SERPL-MCNC: 9.8 MG/DL
CHLORIDE SERPL-SCNC: 103 MMOL/L
CHOLEST SERPL-MCNC: 164 MG/DL
CO2 SERPL-SCNC: 18 MMOL/L
CREAT SERPL-MCNC: 0.95 MG/DL
EGFRCR SERPLBLD CKD-EPI 2021: 90 ML/MIN/1.73M2
GLUCOSE SERPL-MCNC: 186 MG/DL
HDLC SERPL-MCNC: 44 MG/DL
LDLC SERPL-MCNC: 54 MG/DL
NONHDLC SERPL-MCNC: 120 MG/DL
POTASSIUM SERPL-SCNC: 4.4 MMOL/L
PROT SERPL-MCNC: 7.7 G/DL
SODIUM SERPL-SCNC: 141 MMOL/L
T4 FREE SERPL-MCNC: 1.1 NG/DL
T4 SERPL-MCNC: 6.3 UG/DL
TRIGL SERPL-MCNC: 443 MG/DL
TSH SERPL-ACNC: 1.45 UIU/ML

## 2025-04-10 ENCOUNTER — NON-APPOINTMENT (OUTPATIENT)
Age: 65
End: 2025-04-10

## 2025-04-10 ENCOUNTER — APPOINTMENT (OUTPATIENT)
Dept: CARDIOLOGY | Facility: CLINIC | Age: 65
End: 2025-04-10
Payer: COMMERCIAL

## 2025-04-10 VITALS
DIASTOLIC BLOOD PRESSURE: 81 MMHG | HEART RATE: 79 BPM | HEIGHT: 66 IN | SYSTOLIC BLOOD PRESSURE: 135 MMHG | WEIGHT: 162 LBS | BODY MASS INDEX: 26.03 KG/M2 | OXYGEN SATURATION: 99 %

## 2025-04-10 DIAGNOSIS — E11.9 TYPE 2 DIABETES MELLITUS W/OUT COMPLICATIONS: ICD-10-CM

## 2025-04-10 DIAGNOSIS — I10 ESSENTIAL (PRIMARY) HYPERTENSION: ICD-10-CM

## 2025-04-10 DIAGNOSIS — E78.5 HYPERLIPIDEMIA, UNSPECIFIED: ICD-10-CM

## 2025-04-10 PROCEDURE — 99214 OFFICE O/P EST MOD 30 MIN: CPT

## 2025-04-10 PROCEDURE — G2211 COMPLEX E/M VISIT ADD ON: CPT | Mod: NC

## 2025-05-09 ENCOUNTER — RX RENEWAL (OUTPATIENT)
Age: 65
End: 2025-05-09

## 2025-05-12 RX ORDER — INSULIN PEN,REUSABLE,BT LISPRO
INSULIN PEN (EA) SUBCUTANEOUS
Qty: 1 | Refills: 1 | Status: ACTIVE | COMMUNITY
Start: 2025-05-12 | End: 1900-01-01

## 2025-05-14 ENCOUNTER — RX RENEWAL (OUTPATIENT)
Age: 65
End: 2025-05-14

## 2025-06-30 ENCOUNTER — APPOINTMENT (OUTPATIENT)
Dept: ENDOCRINOLOGY | Facility: CLINIC | Age: 65
End: 2025-06-30
Payer: COMMERCIAL

## 2025-06-30 VITALS
SYSTOLIC BLOOD PRESSURE: 149 MMHG | OXYGEN SATURATION: 97 % | HEIGHT: 66 IN | HEART RATE: 66 BPM | DIASTOLIC BLOOD PRESSURE: 87 MMHG | BODY MASS INDEX: 25.29 KG/M2 | WEIGHT: 157.38 LBS

## 2025-06-30 LAB
GLUCOSE BLDC GLUCOMTR-MCNC: 159
HBA1C MFR BLD HPLC: 7.3

## 2025-06-30 PROCEDURE — 82962 GLUCOSE BLOOD TEST: CPT

## 2025-06-30 PROCEDURE — 99214 OFFICE O/P EST MOD 30 MIN: CPT

## 2025-06-30 PROCEDURE — 36415 COLL VENOUS BLD VENIPUNCTURE: CPT

## 2025-06-30 PROCEDURE — 83036 HEMOGLOBIN GLYCOSYLATED A1C: CPT | Mod: QW

## 2025-06-30 PROCEDURE — 95251 CONT GLUC MNTR ANALYSIS I&R: CPT

## 2025-06-30 PROCEDURE — G2211 COMPLEX E/M VISIT ADD ON: CPT | Mod: NC

## 2025-06-30 RX ORDER — DAPAGLIFLOZIN 10 MG/1
10 TABLET, FILM COATED ORAL
Qty: 90 | Refills: 1 | Status: DISCONTINUED | COMMUNITY
Start: 2025-06-24 | End: 2025-06-30

## 2025-07-07 LAB
ALBUMIN SERPL ELPH-MCNC: 4.7 G/DL
ALP BLD-CCNC: 88 U/L
ALT SERPL-CCNC: 35 U/L
ANION GAP SERPL CALC-SCNC: 17 MMOL/L
AST SERPL-CCNC: 25 U/L
BILIRUB SERPL-MCNC: 0.4 MG/DL
BUN SERPL-MCNC: 9 MG/DL
CALCIUM SERPL-MCNC: 9.9 MG/DL
CHLORIDE SERPL-SCNC: 104 MMOL/L
CHOLEST SERPL-MCNC: 148 MG/DL
CO2 SERPL-SCNC: 21 MMOL/L
CREAT SERPL-MCNC: 0.88 MG/DL
CREAT SPEC-SCNC: 77 MG/DL
EGFRCR SERPLBLD CKD-EPI 2021: 97 ML/MIN/1.73M2
GLUCOSE SERPL-MCNC: 162 MG/DL
HDLC SERPL-MCNC: 47 MG/DL
LDLC SERPL-MCNC: 81 MG/DL
MICROALBUMIN 24H UR DL<=1MG/L-MCNC: <1.2 MG/DL
MICROALBUMIN/CREAT 24H UR-RTO: NORMAL MG/G
NONHDLC SERPL-MCNC: 101 MG/DL
POTASSIUM SERPL-SCNC: 4.4 MMOL/L
PROT SERPL-MCNC: 7.8 G/DL
SODIUM SERPL-SCNC: 143 MMOL/L
T4 FREE SERPL-MCNC: 1.2 NG/DL
T4 SERPL-MCNC: 6.6 UG/DL
TRIGL SERPL-MCNC: 107 MG/DL
TSH SERPL-ACNC: 1.33 UIU/ML